# Patient Record
Sex: FEMALE | Race: WHITE | Employment: PART TIME | URBAN - NONMETROPOLITAN AREA
[De-identification: names, ages, dates, MRNs, and addresses within clinical notes are randomized per-mention and may not be internally consistent; named-entity substitution may affect disease eponyms.]

---

## 2017-04-21 LAB
CHOLESTEROL, TOTAL: 193 MG/DL
CHOLESTEROL/HDL RATIO: NORMAL
HDLC SERPL-MCNC: 46 MG/DL (ref 35–70)
LDL CHOLESTEROL CALCULATED: 121 MG/DL (ref 0–160)
TRIGL SERPL-MCNC: 129 MG/DL
VLDLC SERPL CALC-MCNC: NORMAL MG/DL

## 2017-04-25 ENCOUNTER — TELEPHONE (OUTPATIENT)
Dept: FAMILY MEDICINE CLINIC | Age: 62
End: 2017-04-25

## 2017-04-25 DIAGNOSIS — R73.01 ELEVATED FASTING GLUCOSE: Primary | ICD-10-CM

## 2017-04-25 DIAGNOSIS — Z12.39 SCREENING FOR BREAST CANCER: ICD-10-CM

## 2017-04-25 DIAGNOSIS — R74.01 ELEVATED AST (SGOT): ICD-10-CM

## 2017-05-05 LAB — HBA1C MFR BLD: 5.3 %

## 2017-05-11 ENCOUNTER — OFFICE VISIT (OUTPATIENT)
Dept: FAMILY MEDICINE CLINIC | Age: 62
End: 2017-05-11

## 2017-05-11 VITALS
RESPIRATION RATE: 12 BRPM | HEIGHT: 65 IN | DIASTOLIC BLOOD PRESSURE: 80 MMHG | WEIGHT: 186.8 LBS | SYSTOLIC BLOOD PRESSURE: 124 MMHG | BODY MASS INDEX: 31.12 KG/M2 | HEART RATE: 68 BPM

## 2017-05-11 DIAGNOSIS — Z12.11 SCREENING FOR COLON CANCER: ICD-10-CM

## 2017-05-11 DIAGNOSIS — Z00.00 ANNUAL PHYSICAL EXAM: Primary | ICD-10-CM

## 2017-05-11 PROCEDURE — 99396 PREV VISIT EST AGE 40-64: CPT | Performed by: FAMILY MEDICINE

## 2017-05-11 ASSESSMENT — ENCOUNTER SYMPTOMS
SHORTNESS OF BREATH: 0
DIARRHEA: 0
NAUSEA: 0
VOMITING: 0
ABDOMINAL PAIN: 0
BLOOD IN STOOL: 0
EYES NEGATIVE: 1

## 2017-06-13 ENCOUNTER — TELEPHONE (OUTPATIENT)
Dept: FAMILY MEDICINE CLINIC | Age: 62
End: 2017-06-13

## 2017-06-13 ENCOUNTER — NURSE ONLY (OUTPATIENT)
Dept: FAMILY MEDICINE CLINIC | Age: 62
End: 2017-06-13

## 2017-06-13 DIAGNOSIS — Z12.11 SCREEN FOR COLON CANCER: Primary | ICD-10-CM

## 2017-06-13 DIAGNOSIS — K92.1 BLOOD IN STOOL: Primary | ICD-10-CM

## 2017-06-13 LAB
CONTROL: POSITIVE
HEMOCCULT STL QL: POSITIVE

## 2017-06-13 PROCEDURE — 36415 COLL VENOUS BLD VENIPUNCTURE: CPT | Performed by: FAMILY MEDICINE

## 2017-06-13 PROCEDURE — 82274 ASSAY TEST FOR BLOOD FECAL: CPT | Performed by: FAMILY MEDICINE

## 2017-08-28 DIAGNOSIS — F51.01 PRIMARY INSOMNIA: ICD-10-CM

## 2017-08-28 DIAGNOSIS — I10 ESSENTIAL HYPERTENSION: Chronic | ICD-10-CM

## 2017-08-28 RX ORDER — LISINOPRIL 20 MG/1
20 TABLET ORAL DAILY
Qty: 90 TABLET | Refills: 3 | Status: SHIPPED | OUTPATIENT
Start: 2017-08-28 | End: 2018-09-05 | Stop reason: SDUPTHER

## 2017-08-28 RX ORDER — PRAVASTATIN SODIUM 40 MG
40 TABLET ORAL DAILY
Qty: 90 TABLET | Refills: 3 | Status: SHIPPED | OUTPATIENT
Start: 2017-08-28 | End: 2018-09-05 | Stop reason: SDUPTHER

## 2017-08-28 RX ORDER — ZOLPIDEM TARTRATE 5 MG/1
5 TABLET ORAL NIGHTLY PRN
Qty: 30 TABLET | Refills: 5 | Status: SHIPPED | OUTPATIENT
Start: 2017-08-28 | End: 2018-09-05 | Stop reason: SDUPTHER

## 2017-10-19 ENCOUNTER — OFFICE VISIT (OUTPATIENT)
Dept: FAMILY MEDICINE CLINIC | Age: 62
End: 2017-10-19
Payer: COMMERCIAL

## 2017-10-19 VITALS
HEIGHT: 65 IN | TEMPERATURE: 97.8 F | DIASTOLIC BLOOD PRESSURE: 84 MMHG | HEART RATE: 64 BPM | SYSTOLIC BLOOD PRESSURE: 108 MMHG | RESPIRATION RATE: 12 BRPM | BODY MASS INDEX: 31.12 KG/M2 | WEIGHT: 186.8 LBS

## 2017-10-19 DIAGNOSIS — R05.9 COUGH: Primary | ICD-10-CM

## 2017-10-19 DIAGNOSIS — R09.82 POSTNASAL DRIP: ICD-10-CM

## 2017-10-19 PROCEDURE — 99213 OFFICE O/P EST LOW 20 MIN: CPT | Performed by: FAMILY MEDICINE

## 2017-10-19 RX ORDER — PROMETHAZINE HYDROCHLORIDE AND CODEINE PHOSPHATE 6.25; 1 MG/5ML; MG/5ML
SYRUP ORAL
COMMUNITY
Start: 2017-09-11 | End: 2017-10-19 | Stop reason: ALTCHOICE

## 2017-10-19 RX ORDER — DOXYCYCLINE HYCLATE 100 MG
100 TABLET ORAL 2 TIMES DAILY
Qty: 20 TABLET | Refills: 0 | Status: SHIPPED | OUTPATIENT
Start: 2017-10-19 | End: 2017-10-29

## 2017-10-19 RX ORDER — AZITHROMYCIN 250 MG/1
TABLET, FILM COATED ORAL
COMMUNITY
Start: 2017-09-11 | End: 2017-10-19 | Stop reason: ALTCHOICE

## 2017-10-19 ASSESSMENT — ENCOUNTER SYMPTOMS
SHORTNESS OF BREATH: 0
SORE THROAT: 0
GASTROINTESTINAL NEGATIVE: 1
WHEEZING: 0
COUGH: 1

## 2017-10-19 ASSESSMENT — PATIENT HEALTH QUESTIONNAIRE - PHQ9
2. FEELING DOWN, DEPRESSED OR HOPELESS: 0
SUM OF ALL RESPONSES TO PHQ9 QUESTIONS 1 & 2: 0
1. LITTLE INTEREST OR PLEASURE IN DOING THINGS: 0
SUM OF ALL RESPONSES TO PHQ QUESTIONS 1-9: 0

## 2017-10-19 NOTE — PROGRESS NOTES
Chief Complaint   Patient presents with    Cough     seen at [facility] 9/11/2017, still having cough         SUBJECTIVE     Zoe Tran is a 58 y. o.female      Pt complains of ongoing cough over the last 5-6 weeks. Seen at an urgent care in [facility] on 9/11/17. Did CXR which she reports was normal.  Put on Zpak and cough med with codeine. Her cough has persisted. Nonproductive. No fever. No head congestion. Has some postnasal drip. Cough worse with lying flat. Nonsmoker. Review of Systems   Constitutional: Negative for fever. HENT: Positive for postnasal drip. Negative for congestion and sore throat. Respiratory: Positive for cough. Negative for shortness of breath and wheezing. Cardiovascular: Negative. Gastrointestinal: Negative. Neurological: Negative for dizziness and headaches. All other systems reviewed and are negative. OBJECTIVE     /84 (Site: Right Arm, Position: Sitting, Cuff Size: Large Adult)   Pulse 64   Temp 97.8 °F (36.6 °C) (Oral)   Resp 12   Ht 5' 4.5\" (1.638 m)   Wt 186 lb 12.8 oz (84.7 kg)   BMI 31.57 kg/m²     Physical Exam   Constitutional: She appears well-developed and well-nourished. HENT:   Right Ear: Tympanic membrane normal.   Left Ear: Tympanic membrane normal.   Mouth/Throat: Oropharynx is clear and moist.       Cardiovascular: Normal rate and regular rhythm. No murmur heard. Pulmonary/Chest: Breath sounds normal. She has no wheezes. Musculoskeletal: She exhibits no edema. Lymphadenopathy:     She has no cervical adenopathy. ASSESSMENT      1. Cough    2.  Postnasal drip        PLAN     Requested Prescriptions     Signed Prescriptions Disp Refills    doxycycline hyclate (VIBRA-TABS) 100 MG tablet 20 tablet 0     Sig: Take 1 tablet by mouth 2 times daily for 10 days     OTC Flonase 2 sprays per nostril daily      Follow up if not better            Electronically signed by Destiny Nick MD on 10/19/2017 at 2:06

## 2018-04-09 ENCOUNTER — OFFICE VISIT (OUTPATIENT)
Dept: FAMILY MEDICINE CLINIC | Age: 63
End: 2018-04-09
Payer: COMMERCIAL

## 2018-04-09 VITALS
RESPIRATION RATE: 12 BRPM | SYSTOLIC BLOOD PRESSURE: 118 MMHG | WEIGHT: 193.4 LBS | DIASTOLIC BLOOD PRESSURE: 70 MMHG | HEART RATE: 64 BPM | BODY MASS INDEX: 32.68 KG/M2

## 2018-04-09 DIAGNOSIS — G89.29 CHRONIC RIGHT-SIDED LOW BACK PAIN WITH RIGHT-SIDED SCIATICA: Primary | ICD-10-CM

## 2018-04-09 DIAGNOSIS — M54.41 CHRONIC RIGHT-SIDED LOW BACK PAIN WITH RIGHT-SIDED SCIATICA: Primary | ICD-10-CM

## 2018-04-09 DIAGNOSIS — M53.3 PAIN OF RIGHT SACROILIAC JOINT: ICD-10-CM

## 2018-04-09 PROCEDURE — 99213 OFFICE O/P EST LOW 20 MIN: CPT | Performed by: FAMILY MEDICINE

## 2018-04-09 ASSESSMENT — ENCOUNTER SYMPTOMS
BACK PAIN: 1
RESPIRATORY NEGATIVE: 1
ABDOMINAL PAIN: 0

## 2018-04-11 ENCOUNTER — TELEPHONE (OUTPATIENT)
Dept: FAMILY MEDICINE CLINIC | Age: 63
End: 2018-04-11

## 2018-04-11 DIAGNOSIS — M54.41 CHRONIC RIGHT-SIDED LOW BACK PAIN WITH RIGHT-SIDED SCIATICA: Primary | ICD-10-CM

## 2018-04-11 DIAGNOSIS — G89.29 CHRONIC RIGHT-SIDED LOW BACK PAIN WITH RIGHT-SIDED SCIATICA: Primary | ICD-10-CM

## 2018-04-25 ENCOUNTER — TELEPHONE (OUTPATIENT)
Dept: FAMILY MEDICINE CLINIC | Age: 63
End: 2018-04-25

## 2018-05-10 ENCOUNTER — OFFICE VISIT (OUTPATIENT)
Dept: FAMILY MEDICINE CLINIC | Age: 63
End: 2018-05-10
Payer: COMMERCIAL

## 2018-05-10 VITALS
HEIGHT: 65 IN | SYSTOLIC BLOOD PRESSURE: 108 MMHG | WEIGHT: 198.2 LBS | RESPIRATION RATE: 14 BRPM | BODY MASS INDEX: 33.02 KG/M2 | DIASTOLIC BLOOD PRESSURE: 78 MMHG | HEART RATE: 64 BPM | OXYGEN SATURATION: 93 %

## 2018-05-10 DIAGNOSIS — H61.23 BILATERAL IMPACTED CERUMEN: ICD-10-CM

## 2018-05-10 DIAGNOSIS — G89.29 CHRONIC LOW BACK PAIN, UNSPECIFIED BACK PAIN LATERALITY, WITH SCIATICA PRESENCE UNSPECIFIED: ICD-10-CM

## 2018-05-10 DIAGNOSIS — M54.5 CHRONIC LOW BACK PAIN, UNSPECIFIED BACK PAIN LATERALITY, WITH SCIATICA PRESENCE UNSPECIFIED: ICD-10-CM

## 2018-05-10 DIAGNOSIS — Z00.00 ANNUAL PHYSICAL EXAM: Primary | ICD-10-CM

## 2018-05-10 DIAGNOSIS — Z12.31 ENCOUNTER FOR SCREENING MAMMOGRAM FOR MALIGNANT NEOPLASM OF BREAST: ICD-10-CM

## 2018-05-10 PROCEDURE — 99396 PREV VISIT EST AGE 40-64: CPT | Performed by: FAMILY MEDICINE

## 2018-05-10 ASSESSMENT — PATIENT HEALTH QUESTIONNAIRE - PHQ9
1. LITTLE INTEREST OR PLEASURE IN DOING THINGS: 0
SUM OF ALL RESPONSES TO PHQ9 QUESTIONS 1 & 2: 0
2. FEELING DOWN, DEPRESSED OR HOPELESS: 0
SUM OF ALL RESPONSES TO PHQ QUESTIONS 1-9: 0

## 2018-05-10 ASSESSMENT — ENCOUNTER SYMPTOMS
EYES NEGATIVE: 1
NAUSEA: 0
SHORTNESS OF BREATH: 0
BLOOD IN STOOL: 0
DIARRHEA: 0
VOMITING: 0
ABDOMINAL PAIN: 0
BACK PAIN: 1

## 2018-07-21 LAB
AVERAGE GLUCOSE: NORMAL
CHOLESTEROL, TOTAL: 197 MG/DL
CHOLESTEROL/HDL RATIO: NORMAL
HBA1C MFR BLD: 5.7 %
HDLC SERPL-MCNC: 41 MG/DL (ref 35–70)
LDL CHOLESTEROL CALCULATED: 117 MG/DL (ref 0–160)
TRIGL SERPL-MCNC: 193 MG/DL
VLDLC SERPL CALC-MCNC: NORMAL MG/DL

## 2018-07-25 ENCOUNTER — TELEPHONE (OUTPATIENT)
Dept: FAMILY MEDICINE CLINIC | Age: 63
End: 2018-07-25

## 2018-07-25 DIAGNOSIS — R79.89 ABNORMAL LFTS: Primary | ICD-10-CM

## 2018-07-27 ENCOUNTER — TELEPHONE (OUTPATIENT)
Dept: FAMILY MEDICINE CLINIC | Age: 63
End: 2018-07-27

## 2018-07-27 DIAGNOSIS — M25.551 RIGHT HIP PAIN: ICD-10-CM

## 2018-07-27 DIAGNOSIS — G89.29 CHRONIC LOW BACK PAIN, UNSPECIFIED BACK PAIN LATERALITY, WITH SCIATICA PRESENCE UNSPECIFIED: Primary | ICD-10-CM

## 2018-07-27 DIAGNOSIS — M54.5 CHRONIC LOW BACK PAIN, UNSPECIFIED BACK PAIN LATERALITY, WITH SCIATICA PRESENCE UNSPECIFIED: Primary | ICD-10-CM

## 2018-07-27 NOTE — TELEPHONE ENCOUNTER
Referral to Dr Micki Qiu made for 8/21/2018 @ 1020. Arrive 15 mins early. Take insurance card, id and list of meds. [facility] needs to take disc to appt. Left message for pt regarding this information.

## 2018-08-01 ENCOUNTER — HOSPITAL ENCOUNTER (OUTPATIENT)
Dept: MAMMOGRAPHY | Age: 63
Discharge: HOME | End: 2018-08-01
Payer: COMMERCIAL

## 2018-08-01 DIAGNOSIS — Z12.31 ENCOUNTER FOR SCREENING MAMMOGRAM FOR MALIGNANT NEOPLASM OF BREAST: ICD-10-CM

## 2018-08-01 PROCEDURE — 77067 SCR MAMMO BI INCL CAD: CPT

## 2018-08-07 ENCOUNTER — TELEPHONE (OUTPATIENT)
Dept: FAMILY MEDICINE CLINIC | Age: 63
End: 2018-08-07

## 2018-08-07 DIAGNOSIS — R79.89 ELEVATED LFTS: Primary | ICD-10-CM

## 2018-09-05 DIAGNOSIS — F51.01 PRIMARY INSOMNIA: ICD-10-CM

## 2018-09-05 DIAGNOSIS — I10 ESSENTIAL HYPERTENSION: Chronic | ICD-10-CM

## 2018-09-05 RX ORDER — PRAVASTATIN SODIUM 40 MG
40 TABLET ORAL DAILY
Qty: 90 TABLET | Refills: 3 | Status: SHIPPED | OUTPATIENT
Start: 2018-09-05 | End: 2019-05-16 | Stop reason: SDUPTHER

## 2018-09-05 RX ORDER — ZOLPIDEM TARTRATE 5 MG/1
5 TABLET ORAL NIGHTLY PRN
Qty: 30 TABLET | Refills: 1 | Status: SHIPPED | OUTPATIENT
Start: 2018-09-05 | End: 2019-03-05

## 2018-09-05 RX ORDER — LISINOPRIL 20 MG/1
20 TABLET ORAL DAILY
Qty: 90 TABLET | Refills: 3 | Status: SHIPPED | OUTPATIENT
Start: 2018-09-05 | End: 2019-05-16 | Stop reason: SDUPTHER

## 2018-10-23 ENCOUNTER — TELEPHONE (OUTPATIENT)
Dept: FAMILY MEDICINE CLINIC | Age: 63
End: 2018-10-23

## 2018-10-30 ENCOUNTER — OFFICE VISIT (OUTPATIENT)
Dept: FAMILY MEDICINE CLINIC | Age: 63
End: 2018-10-30
Payer: COMMERCIAL

## 2018-10-30 ENCOUNTER — TELEPHONE (OUTPATIENT)
Dept: FAMILY MEDICINE CLINIC | Age: 63
End: 2018-10-30

## 2018-10-30 VITALS
RESPIRATION RATE: 16 BRPM | SYSTOLIC BLOOD PRESSURE: 112 MMHG | WEIGHT: 189 LBS | BODY MASS INDEX: 31.45 KG/M2 | DIASTOLIC BLOOD PRESSURE: 80 MMHG | HEART RATE: 64 BPM | TEMPERATURE: 97.4 F

## 2018-10-30 DIAGNOSIS — L30.4 INTERTRIGO: Primary | ICD-10-CM

## 2018-10-30 PROCEDURE — 99213 OFFICE O/P EST LOW 20 MIN: CPT | Performed by: FAMILY MEDICINE

## 2018-10-30 RX ORDER — FLUCONAZOLE 100 MG/1
100 TABLET ORAL DAILY
Qty: 7 TABLET | Refills: 0 | Status: SHIPPED | OUTPATIENT
Start: 2018-10-30 | End: 2018-11-06 | Stop reason: SDUPTHER

## 2018-10-30 ASSESSMENT — ENCOUNTER SYMPTOMS
GASTROINTESTINAL NEGATIVE: 1
RESPIRATORY NEGATIVE: 1

## 2018-10-30 NOTE — TELEPHONE ENCOUNTER
[facility].  MALLY

## 2018-11-06 ENCOUNTER — TELEPHONE (OUTPATIENT)
Dept: FAMILY MEDICINE CLINIC | Age: 63
End: 2018-11-06

## 2018-11-06 DIAGNOSIS — L30.4 INTERTRIGO: ICD-10-CM

## 2018-11-06 RX ORDER — FLUCONAZOLE 100 MG/1
100 TABLET ORAL DAILY
Qty: 7 TABLET | Refills: 0 | Status: SHIPPED | OUTPATIENT
Start: 2018-11-06 | End: 2018-11-13

## 2018-11-06 NOTE — TELEPHONE ENCOUNTER
Patient states that her rash is clearing but it is not gone and she took her last pill today. Does she need to give it more time or do you want to give her something else. She uses [facility] in [facility].   Please call her back at [phone]

## 2019-05-16 ENCOUNTER — OFFICE VISIT (OUTPATIENT)
Dept: FAMILY MEDICINE CLINIC | Age: 64
End: 2019-05-16
Payer: COMMERCIAL

## 2019-05-16 VITALS
DIASTOLIC BLOOD PRESSURE: 82 MMHG | SYSTOLIC BLOOD PRESSURE: 120 MMHG | HEIGHT: 65 IN | BODY MASS INDEX: 30.82 KG/M2 | HEART RATE: 68 BPM | TEMPERATURE: 97.6 F | RESPIRATION RATE: 12 BRPM | WEIGHT: 185 LBS

## 2019-05-16 DIAGNOSIS — I10 ESSENTIAL HYPERTENSION: Chronic | ICD-10-CM

## 2019-05-16 DIAGNOSIS — Z12.31 ENCOUNTER FOR SCREENING MAMMOGRAM FOR MALIGNANT NEOPLASM OF BREAST: ICD-10-CM

## 2019-05-16 DIAGNOSIS — Z00.00 ANNUAL PHYSICAL EXAM: Primary | ICD-10-CM

## 2019-05-16 DIAGNOSIS — F51.01 PRIMARY INSOMNIA: ICD-10-CM

## 2019-05-16 DIAGNOSIS — E78.5 HYPERLIPIDEMIA, UNSPECIFIED HYPERLIPIDEMIA TYPE: ICD-10-CM

## 2019-05-16 PROCEDURE — 99396 PREV VISIT EST AGE 40-64: CPT | Performed by: FAMILY MEDICINE

## 2019-05-16 RX ORDER — ZOLPIDEM TARTRATE 5 MG/1
5 TABLET ORAL NIGHTLY PRN
Qty: 30 TABLET | Refills: 1 | Status: SHIPPED | OUTPATIENT
Start: 2019-05-16 | End: 2019-08-16

## 2019-05-16 RX ORDER — PRAVASTATIN SODIUM 40 MG
40 TABLET ORAL DAILY
Qty: 90 TABLET | Refills: 3 | Status: SHIPPED | OUTPATIENT
Start: 2019-05-16 | End: 2020-05-04 | Stop reason: SDUPTHER

## 2019-05-16 RX ORDER — ZOLPIDEM TARTRATE 5 MG/1
5 TABLET ORAL NIGHTLY PRN
COMMUNITY
End: 2019-05-16 | Stop reason: SDUPTHER

## 2019-05-16 RX ORDER — LISINOPRIL 20 MG/1
20 TABLET ORAL DAILY
Qty: 90 TABLET | Refills: 3 | Status: SHIPPED | OUTPATIENT
Start: 2019-05-16 | End: 2020-05-04 | Stop reason: SDUPTHER

## 2019-05-16 ASSESSMENT — VISUAL ACUITY
OS_CC: 20/20
OD_CC: 20/20

## 2019-05-16 ASSESSMENT — ENCOUNTER SYMPTOMS
BLOOD IN STOOL: 0
ABDOMINAL PAIN: 0
SHORTNESS OF BREATH: 0
DIARRHEA: 0
NAUSEA: 0
EYES NEGATIVE: 1
VOMITING: 0

## 2019-05-16 ASSESSMENT — PATIENT HEALTH QUESTIONNAIRE - PHQ9
SUM OF ALL RESPONSES TO PHQ QUESTIONS 1-9: 0
SUM OF ALL RESPONSES TO PHQ QUESTIONS 1-9: 0
2. FEELING DOWN, DEPRESSED OR HOPELESS: 0
1. LITTLE INTEREST OR PLEASURE IN DOING THINGS: 0
SUM OF ALL RESPONSES TO PHQ9 QUESTIONS 1 & 2: 0

## 2019-05-16 NOTE — PROGRESS NOTES
Chief Complaint   Patient presents with    Annual Exam     Annual Work Physical for        JESS Nichols is a 61 y. o.female    Pt presents for annual wellness physical exam.        Pt stable since last visit- no newproblems for diagnoses listed below:  Patient Active Problem List   Diagnosis    Hyperlipidemia    Insomnia    Essential hypertension     Doing well. Denies complaint today. Needs clearance for bus driving. She denies problems with vision or hearing. No uncontrolled blood pressure. No h/o seizure disorder. No color blindness. Her chronic conditions are stable. She uses 2.5mg of ambien once weekly, usually on Sundays. She remains active. Colon cancer screening UTD. Due for mammogram soon. Will be due for labs in July. Takes all meds as directed and denies side effects. No recent illnesses or hospitalizations. Nonsmoker. Body mass index is 30.79 kg/m². Review of Systems   Constitutional: Negative for chills, fatigue and fever. HENT: Negative. Eyes: Negative. Respiratory: Negative for shortness of breath. Cardiovascular: Negative for chest pain, palpitations and leg swelling. Gastrointestinal: Negative for abdominal pain, blood in stool, diarrhea, nausea and vomiting. Genitourinary: Negative for dysuria. Musculoskeletal: Negative for arthralgias and myalgias. Skin: Negative for rash. Neurological: Negative for dizziness and headaches. Hematological: Negative for adenopathy. Psychiatric/Behavioral: Negative. All other systems reviewed and are negative.           OBJECTIVE     /82 (Site: Left Upper Arm, Position: Sitting, Cuff Size: Medium Adult)   Pulse 68   Temp 97.6 °F (36.4 °C) (Oral)   Resp 12   Ht 5' 5\" (1.651 m)   Wt 185 lb (83.9 kg)   BMI 30.79 kg/m²     Wt Readings from Last 3 Encounters:   05/16/19 185 lb (83.9 kg)   10/30/18 189 lb (85.7 kg)   05/10/18 198 lb 3.2 oz (89.9 kg)       Physical Exam Constitutional: She is oriented to person, place, and time. She appears well-developed and well-nourished. HENT:   Head: Normocephalic and atraumatic. Mouth/Throat: Oropharynx is clear and moist.   TM's normal.   Eyes: Conjunctivae are normal.   Neck: Neck supple. Carotid bruit is not present. No thyromegaly present. Cardiovascular: Normal rate, regular rhythm and normal heart sounds. No murmur heard. Pulmonary/Chest: Effort normal and breath sounds normal. She has no wheezes. Abdominal: Soft. Bowel sounds are normal. There is no tenderness. There is no rebound and no guarding. Musculoskeletal: She exhibits no edema. Lymphadenopathy:     She has no cervical adenopathy. Neurological: She is alert and oriented to person, place, and time. Skin: Skin is warm and dry. No rash noted. Psychiatric: She has a normal mood and affect. Her behavior is normal.   Vitals reviewed. Immunization History   Administered Date(s) Administered    PPD Test 08/13/2012    Tdap (Boostrix, Adacel) 08/13/2012         Health Maintenance   Topic Date Due    Hepatitis C screen  1955    HIV screen  09/17/1970    Shingles Vaccine (1 of 2) 09/17/2005    Potassium monitoring  03/23/2016    Creatinine monitoring  03/23/2016    Cervical cancer screen  05/12/2018    Flu vaccine (Season Ended) 10/30/2019 (Originally 9/1/2019)    A1C test (Diabetic or Prediabetic)  07/21/2019    Breast cancer screen  08/01/2019    Colon cancer screen colonoscopy  08/02/2022    DTaP/Tdap/Td vaccine (2 - Td) 08/13/2022    Lipid screen  07/21/2023    Pneumococcal 0-64 years Vaccine  Aged Out         ASSESSMENT      1. Annual physical exam    2. Essential hypertension    3. Primary insomnia    4. Hyperlipidemia, unspecified hyperlipidemia type    5.  Encounter for screening mammogram for malignant neoplasm of breast          PLAN        Orders Placed This Encounter   Procedures    SOPHIE DIGITAL SCREEN W CAD BILATERAL Standing Status:   Future     Standing Expiration Date:   7/16/2020     Order Specific Question:   Reason for exam:     Answer:   screening    Comprehensive Metabolic Panel     Standing Status:   Future     Standing Expiration Date:   5/15/2020    Hemoglobin A1C     Standing Status:   Future     Standing Expiration Date:   5/15/2020    Lipid Panel     Standing Status:   Future     Standing Expiration Date:   5/15/2020     Order Specific Question:   Is Patient Fasting?/# of Hours     Answer:   12     Mammogram in August    Labs as above in July    Continue current meds    Controlled Substances Monitoring:     RX Monitoring 5/16/2019   Attestation The Prescription Monitoring Report for this patient was reviewed today. 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 Maulik Dominic received counseling on the following healthy behaviors: medication adherence    Patient given educational materials on wellness for age. I have instructed Maulik Dominic to complete a self tracking handout on Blood Pressures  and instructed them to bring it with them to her next appointment. Discussed use, benefit, and side effects of prescribed medications. Barriers to medication compliance addressed. All patient questions answered. Pt voiced understanding.          Electronically signed by Brandy Casey MD on 5/16/2019 at 12:55 PM

## 2019-05-16 NOTE — PATIENT INSTRUCTIONS
early.  · Cholesterol. Your doctor will tell you how often to have this done based on your age, family history, or other things that can increase your risk for heart attack and stroke. · Blood pressure. Have your blood pressure checked during a routine doctor visit. Your doctor will tell you how often to check your blood pressure based on your age, your blood pressure results, and other factors. · Mammogram. Ask your doctor how often you should have a mammogram, which is an X-ray of your breasts. A mammogram can spot breast cancer before it can be felt and when it is easiest to treat. · Pap test and pelvic exam. Ask your doctor how often you should have a Pap test. You may not need to have a Pap test as often as you used to. · Vision. Have your eyes checked every year or two or as often as your doctor suggests. Some experts recommend that you have yearly exams for glaucoma and other age-related eye problems starting at age 48. · Hearing. Tell your doctor if you notice any change in your hearing. You can have tests to find out how well you hear. · Diabetes. Ask your doctor whether you should have tests for diabetes. · Colorectal cancer. Your risk for colorectal cancer gets higher as you get older. Some experts say that adults should start regular screening at age 48 and stop at age 76. Others say to start before age 48 or continue after age 76. Talk with your doctor about your risk and when to start and stop screening. · Thyroid disease. Talk to your doctor about whether to have your thyroid checked as part of a regular physical exam. Women have an increased chance of a thyroid problem. · Osteoporosis. You should begin tests for bone density at age 72. If you are younger than 72, ask your doctor whether you have factors that may increase your risk for this disease. You may want to have this test before age 72. · Heart attack and stroke risk.  At least every 4 to 6 years, you should have your risk for heart attack and stroke assessed. Your doctor uses factors such as your age, blood pressure, cholesterol, and whether you smoke or have diabetes to show what your risk for a heart attack or stroke is over the next 10 years. When should you call for help? Watch closely for changes in your health, and be sure to contact your doctor if you have any problems or symptoms that concern you. Where can you learn more? Go to https://Epic SciencespediaDexuseweb.Snoball. org and sign in to your [employer]Helper account. Enter L225 in the [facility] box to learn more about \"Well Visit, Women 50 to 72: Care Instructions. \"     If you do not have an account, please click on the \"Sign Up Now\" link. Current as of: December 13, 2018  Content Version: 12.0  © 7691-0868 Healthwise, Incorporated. Care instructions adapted under license by [facility] ([facility]). If you have questions about a medical condition or this instruction, always ask your healthcare professional. [place] [address] any warranty or liability for your use of this information.

## 2019-07-30 LAB
AVERAGE GLUCOSE: NORMAL
CHOLESTEROL, TOTAL: 188 MG/DL
CHOLESTEROL/HDL RATIO: NORMAL
HBA1C MFR BLD: 5.8 %
HDLC SERPL-MCNC: 42 MG/DL (ref 35–70)
LDL CHOLESTEROL CALCULATED: 109 MG/DL (ref 0–160)
TRIGL SERPL-MCNC: 186 MG/DL
VLDLC SERPL CALC-MCNC: NORMAL MG/DL

## 2019-08-01 ENCOUNTER — TELEPHONE (OUTPATIENT)
Dept: FAMILY MEDICINE CLINIC | Age: 64
End: 2019-08-01

## 2019-08-01 NOTE — TELEPHONE ENCOUNTER
----- Message from Selena Mcdaniel MD sent at 7/31/2019  9:47 PM EDT -----  Notify her that her labs look ok. LFTs improved from previous. Continue current meds.  CG

## 2019-08-02 ENCOUNTER — HOSPITAL ENCOUNTER (OUTPATIENT)
Dept: MAMMOGRAPHY | Age: 64
Discharge: HOME | End: 2019-08-02
Payer: COMMERCIAL

## 2019-08-02 DIAGNOSIS — Z12.31 ENCOUNTER FOR SCREENING MAMMOGRAM FOR MALIGNANT NEOPLASM OF BREAST: ICD-10-CM

## 2019-08-02 PROCEDURE — 77067 SCR MAMMO BI INCL CAD: CPT

## 2019-10-28 ENCOUNTER — TELEPHONE (OUTPATIENT)
Dept: FAMILY MEDICINE CLINIC | Age: 64
End: 2019-10-28

## 2020-05-04 ENCOUNTER — OFFICE VISIT (OUTPATIENT)
Dept: PRIMARY CARE CLINIC | Age: 65
End: 2020-05-04
Payer: COMMERCIAL

## 2020-05-04 VITALS
WEIGHT: 192 LBS | DIASTOLIC BLOOD PRESSURE: 76 MMHG | SYSTOLIC BLOOD PRESSURE: 120 MMHG | OXYGEN SATURATION: 96 % | HEIGHT: 64 IN | RESPIRATION RATE: 16 BRPM | BODY MASS INDEX: 32.78 KG/M2 | TEMPERATURE: 97.6 F | HEART RATE: 68 BPM

## 2020-05-04 PROCEDURE — 99396 PREV VISIT EST AGE 40-64: CPT | Performed by: FAMILY MEDICINE

## 2020-05-04 RX ORDER — ZOLPIDEM TARTRATE 5 MG/1
5 TABLET ORAL NIGHTLY PRN
Qty: 30 TABLET | Refills: 0 | Status: SHIPPED | OUTPATIENT
Start: 2020-05-04 | End: 2021-01-04 | Stop reason: SDUPTHER

## 2020-05-04 RX ORDER — PRAVASTATIN SODIUM 40 MG
40 TABLET ORAL DAILY
Qty: 90 TABLET | Refills: 3 | Status: SHIPPED | OUTPATIENT
Start: 2020-05-04 | End: 2021-05-18 | Stop reason: SDUPTHER

## 2020-05-04 RX ORDER — ZOLPIDEM TARTRATE 5 MG/1
5 TABLET ORAL NIGHTLY PRN
COMMUNITY
End: 2020-05-04 | Stop reason: SDUPTHER

## 2020-05-04 RX ORDER — LISINOPRIL 20 MG/1
20 TABLET ORAL DAILY
Qty: 90 TABLET | Refills: 3 | Status: SHIPPED | OUTPATIENT
Start: 2020-05-04 | End: 2021-05-18 | Stop reason: SDUPTHER

## 2020-05-04 ASSESSMENT — ENCOUNTER SYMPTOMS
SHORTNESS OF BREATH: 0
VOMITING: 0
BLOOD IN STOOL: 0
NAUSEA: 0
ABDOMINAL PAIN: 0
DIARRHEA: 0
EYES NEGATIVE: 1

## 2020-05-04 ASSESSMENT — PATIENT HEALTH QUESTIONNAIRE - PHQ9
SUM OF ALL RESPONSES TO PHQ QUESTIONS 1-9: 0
SUM OF ALL RESPONSES TO PHQ QUESTIONS 1-9: 0
1. LITTLE INTEREST OR PLEASURE IN DOING THINGS: 0
SUM OF ALL RESPONSES TO PHQ9 QUESTIONS 1 & 2: 0
2. FEELING DOWN, DEPRESSED OR HOPELESS: 0

## 2020-05-04 ASSESSMENT — VISUAL ACUITY
OS_CC: 20/20
OD_CC: 20/20

## 2020-05-04 NOTE — PROGRESS NOTES
1. Annual physical exam    2. Essential hypertension    3. Hyperlipidemia, unspecified hyperlipidemia type    4. Primary insomnia    5. Encounter for screening mammogram for malignant neoplasm of breast        PLAN        Orders Placed This Encounter   Procedures    SOPHIE LELAND DIGITAL SCREEN BILATERAL     Standing Status:   Future     Standing Expiration Date:   7/4/2021     Order Specific Question:   Reason for exam:     Answer:   screening    Lipid Panel     Standing Status:   Future     Standing Expiration Date:   5/4/2021     Order Specific Question:   Is Patient Fasting?/# of Hours     Answer:   12    Comprehensive Metabolic Panel     Standing Status:   Future     Standing Expiration Date:   5/4/2021     Labs and mammogram as above this summer    Med refills as below    OARRS report reviewed and no contraindications 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 Requested Prescriptions     Signed Prescriptions Disp Refills    lisinopril (PRINIVIL;ZESTRIL) 20 MG tablet 90 tablet 3     Sig: Take 1 tablet by mouth daily    pravastatin (PRAVACHOL) 40 MG tablet 90 tablet 3     Sig: Take 1 tablet by mouth daily    zolpidem (AMBIEN) 5 MG tablet 30 tablet 0     Sig: Take 1 tablet by mouth nightly as needed for Sleep for up to 30 doses. Jerome Brothers received counseling on the following healthy behaviors: nutrition, exercise and medication adherence    Patient given educational materials on wellness for age. I have instructed Jerome Brothers to complete a self tracking handout on Blood Pressures  and instructed them to bring it with them to her next appointment. Discussed use, benefit, and side effects of prescribed medications. Barriers to medication compliance addressed. All patient questions answered. Pt voiced understanding.      Follow up yearly and prn        Electronically signed by Elena Correa MD on 5/4/2020 at 2:02 PM

## 2020-05-04 NOTE — PATIENT INSTRUCTIONS
Patient Education        Well Visit, Women 48 to 72: Care Instructions  Your Care Instructions    Physical exams can help you stay healthy. Your doctor has checked your overall health and may have suggested ways to take good care of yourself. He or she also may have recommended tests. At home, you can help prevent illness with healthy eating, regular exercise, and other steps. Follow-up care is a key part of your treatment and safety. Be sure to make and go to all appointments, and call your doctor if you are having problems. It's also a good idea to know your test results and keep a list of the medicines you take. How can you care for yourself at home? · Reach and stay at a healthy weight. This will lower your risk for many problems, such as obesity, diabetes, heart disease, and high blood pressure. · Get at least 30 minutes of exercise on most days of the week. Walking is a good choice. You also may want to do other activities, such as running, swimming, cycling, or playing tennis or team sports. · Do not smoke. Smoking can make health problems worse. If you need help quitting, talk to your doctor about stop-smoking programs and medicines. These can increase your chances of quitting for good. · Protect your skin from too much sun. When you're outdoors from 10 a.m. to 4 p.m., stay in the shade or cover up with clothing and a hat with a wide brim. Wear sunglasses that block UV rays. Even when it's cloudy, put broad-spectrum sunscreen (SPF 30 or higher) on any exposed skin. · See a dentist one or two times a year for checkups and to have your teeth cleaned. · Wear a seat belt in the car. Follow your doctor's advice about when to have certain tests. These tests can spot problems early. · Cholesterol. Your doctor will tell you how often to have this done based on your age, family history, or other things that can increase your risk for heart attack and stroke. · Blood pressure.  Have your blood pressure checked during a routine doctor visit. Your doctor will tell you how often to check your blood pressure based on your age, your blood pressure results, and other factors. · Mammogram. Ask your doctor how often you should have a mammogram, which is an X-ray of your breasts. A mammogram can spot breast cancer before it can be felt and when it is easiest to treat. · Pap test and pelvic exam. Ask your doctor how often you should have a Pap test. You may not need to have a Pap test as often as you used to. · Vision. Have your eyes checked every year or two or as often as your doctor suggests. Some experts recommend that you have yearly exams for glaucoma and other age-related eye problems starting at age 48. · Hearing. Tell your doctor if you notice any change in your hearing. You can have tests to find out how well you hear. · Diabetes. Ask your doctor whether you should have tests for diabetes. · Colorectal cancer. Your risk for colorectal cancer gets higher as you get older. Some experts say that adults should start regular screening at age 48 and stop at age 76. Others say to start before age 48 or continue after age 76. Talk with your doctor about your risk and when to start and stop screening. · Thyroid disease. Talk to your doctor about whether to have your thyroid checked as part of a regular physical exam. Women have an increased chance of a thyroid problem. · Osteoporosis. You should begin tests for bone density at age 72. If you are younger than 72, ask your doctor whether you have factors that may increase your risk for this disease. You may want to have this test before age 72. · Heart attack and stroke risk. At least every 4 to 6 years, you should have your risk for heart attack and stroke assessed. Your doctor uses factors such as your age, blood pressure, cholesterol, and whether you smoke or have diabetes to show what your risk for a heart attack or stroke is over the next 10 years.   When should you call for help? Watch closely for changes in your health, and be sure to contact your doctor if you have any problems or symptoms that concern you. Where can you learn more? Go to https://chharsh.healthDreamise. org and sign in to your [facility] account. Enter J829 in the [facility] box to learn more about \"Well Visit, Women 50 to 72: Care Instructions. \"     If you do not have an account, please click on the \"Sign Up Now\" link. Current as of: August 21, 2019Content Version: 12.4  © 9303-8601 Healthwise, Incorporated. Care instructions adapted under license by [address] If you have questions about a medical condition or this instruction, always ask your healthcare professional. [address] any warranty or liability for your use of this information.

## 2020-07-13 ENCOUNTER — OFFICE VISIT (OUTPATIENT)
Dept: FAMILY MEDICINE CLINIC | Age: 65
End: 2020-07-13
Payer: COMMERCIAL

## 2020-07-13 VITALS
BODY MASS INDEX: 32.02 KG/M2 | DIASTOLIC BLOOD PRESSURE: 70 MMHG | RESPIRATION RATE: 12 BRPM | WEIGHT: 192.2 LBS | TEMPERATURE: 98.1 F | HEIGHT: 65 IN | SYSTOLIC BLOOD PRESSURE: 120 MMHG | HEART RATE: 60 BPM

## 2020-07-13 PROCEDURE — 99213 OFFICE O/P EST LOW 20 MIN: CPT | Performed by: FAMILY MEDICINE

## 2020-07-13 SDOH — ECONOMIC STABILITY: FOOD INSECURITY: WITHIN THE PAST 12 MONTHS, THE FOOD YOU BOUGHT JUST DIDN'T LAST AND YOU DIDN'T HAVE MONEY TO GET MORE.: NEVER TRUE

## 2020-07-13 SDOH — ECONOMIC STABILITY: FOOD INSECURITY: WITHIN THE PAST 12 MONTHS, YOU WORRIED THAT YOUR FOOD WOULD RUN OUT BEFORE YOU GOT MONEY TO BUY MORE.: NEVER TRUE

## 2020-07-13 SDOH — ECONOMIC STABILITY: TRANSPORTATION INSECURITY
IN THE PAST 12 MONTHS, HAS LACK OF TRANSPORTATION KEPT YOU FROM MEETINGS, WORK, OR FROM GETTING THINGS NEEDED FOR DAILY LIVING?: NO

## 2020-07-13 SDOH — ECONOMIC STABILITY: TRANSPORTATION INSECURITY
IN THE PAST 12 MONTHS, HAS THE LACK OF TRANSPORTATION KEPT YOU FROM MEDICAL APPOINTMENTS OR FROM GETTING MEDICATIONS?: NO

## 2020-07-13 SDOH — ECONOMIC STABILITY: INCOME INSECURITY: HOW HARD IS IT FOR YOU TO PAY FOR THE VERY BASICS LIKE FOOD, HOUSING, MEDICAL CARE, AND HEATING?: NOT HARD AT ALL

## 2020-07-13 ASSESSMENT — ENCOUNTER SYMPTOMS
RESPIRATORY NEGATIVE: 1
SINUS PAIN: 0
SORE THROAT: 0
SINUS PRESSURE: 0

## 2020-07-13 NOTE — PROGRESS NOTES
Chief Complaint   Patient presents with   Mary Acosta is a 59 y. o.female      Pt complains of a 2-3 day h/o right ear fullness and decreased hearing. Has tried sweet oil in the ear but it's no better. No pain or congestion. No fever. No drainage from the ear. She has a h/o wax buildup in the past.    Review of Systems   Constitutional: Negative for fever. HENT: Positive for hearing loss (right). Negative for congestion, ear discharge, ear pain, sinus pressure, sinus pain and sore throat. Respiratory: Negative. Neurological: Negative. All other systems reviewed and are negative. OBJECTIVE     /70   Pulse 60   Temp 98.1 °F (36.7 °C) (Infrared)   Resp 12   Ht 5' 4.7\" (1.643 m)   Wt 192 lb 3.2 oz (87.2 kg)   BMI 32.28 kg/m²     Physical Exam  Vitals signs reviewed. Constitutional:       General: She is not in acute distress. Appearance: She is well-developed. HENT:      Head: Normocephalic and atraumatic. Right Ear: There is impacted cerumen. Left Ear: Tympanic membrane normal.   Eyes:      Conjunctiva/sclera: Conjunctivae normal.   Cardiovascular:      Rate and Rhythm: Normal rate and regular rhythm. Heart sounds: No murmur. Pulmonary:      Breath sounds: Normal breath sounds. No wheezing. Musculoskeletal:      Right lower leg: No edema. Left lower leg: No edema. Lymphadenopathy:      Cervical: No cervical adenopathy. Neurological:      Mental Status: She is alert. ASSESSMENT      1.  Excessive cerumen in right ear canal        PLAN     Warm water irrigation of the right ear performed      Follow up if not better            Electronically signed by Preston Stone MD on 7/13/2020 at 2:00 PM

## 2020-08-06 ENCOUNTER — HOSPITAL ENCOUNTER (OUTPATIENT)
Dept: MAMMOGRAPHY | Age: 65
Discharge: HOME | End: 2020-08-06
Payer: COMMERCIAL

## 2020-09-02 ENCOUNTER — HOSPITAL ENCOUNTER (OUTPATIENT)
Dept: MAMMOGRAPHY | Age: 65
Discharge: HOME | End: 2020-09-02
Payer: MEDICARE

## 2020-09-02 PROCEDURE — 77063 BREAST TOMOSYNTHESIS BI: CPT

## 2020-10-19 ENCOUNTER — VIRTUAL VISIT (OUTPATIENT)
Dept: FAMILY MEDICINE CLINIC | Age: 65
End: 2020-10-19
Payer: MEDICARE

## 2020-10-19 PROCEDURE — 99214 OFFICE O/P EST MOD 30 MIN: CPT | Performed by: NURSE PRACTITIONER

## 2020-10-19 ASSESSMENT — ENCOUNTER SYMPTOMS
SINUS PAIN: 0
SHORTNESS OF BREATH: 0
VOMITING: 0
NAUSEA: 0
DIARRHEA: 0
ABDOMINAL PAIN: 0
BACK PAIN: 0
WHEEZING: 0
TROUBLE SWALLOWING: 0
COLOR CHANGE: 0
RHINORRHEA: 0
SINUS PRESSURE: 0
COUGH: 1

## 2020-10-19 NOTE — PROGRESS NOTES
[facility]  [address] [facility] [address]  Dept: [phone]  Dept Fax: [phone]: [phone]      10/19/2020    TELEHEALTH EVALUATION -- Audio/Visual (During QFEOJ-62 public health emergency)    HPI:    Milena Mccartney (:  1955) has requested an audio/video evaluation for the following concern(s):    Pt presents to the office today via video visit. She reports that she Started on 10/15/2020 with body aches and chills. She has also has some sneezing and congestion. Her boyfriend with S/S as well and he is getting tested for COVID today. Today she did have a 101 fever and now she no longer can taste or smell. She denies any SOB or chest pain. Fever    This is a new problem. The current episode started today. The problem has been unchanged. The maximum temperature noted was 101 to 101.9 F. Associated symptoms include congestion, coughing, headaches, muscle aches and sleepiness. Pertinent negatives include no abdominal pain, chest pain, diarrhea, nausea, rash, urinary pain, vomiting or wheezing. She has tried acetaminophen and NSAIDs for the symptoms. The treatment provided mild relief. Risk factors: sick contacts    Risk factors: no contaminated food, no contaminated water, no hx of cancer, no immunosuppression, no occupational exposure, no recent sickness and no recent travel        Review of Systems   Constitutional: Positive for chills, fatigue and fever. Negative for diaphoresis. HENT: Positive for congestion and sneezing. Negative for nosebleeds, postnasal drip, rhinorrhea, sinus pressure, sinus pain and trouble swallowing. Respiratory: Positive for cough. Negative for shortness of breath and wheezing. Cardiovascular: Negative for chest pain and palpitations. Gastrointestinal: Negative for abdominal pain, diarrhea, nausea and vomiting. Genitourinary: Negative for dysuria.    Musculoskeletal: Positive for arthralgias. Negative for back pain, gait problem and myalgias. Skin: Negative for color change and rash. Neurological: Positive for headaches. Negative for dizziness and weakness. Prior to Visit Medications    Medication Sig Taking? Authorizing Provider   lisinopril (PRINIVIL;ZESTRIL) 20 MG tablet Take 1 tablet by mouth daily  Brett Anthony MD   pravastatin (PRAVACHOL) 40 MG tablet Take 1 tablet by mouth daily  Brett Anthony MD   zolpidem (AMBIEN) 5 MG tablet Take 1 tablet by mouth nightly as needed for Sleep for up to 30 doses. Brett Anthony MD   aspirin 81 MG tablet Take 81 mg by mouth daily  Historical Provider, MD       Social History     Tobacco Use    Smoking status: Never Smoker    Smokeless tobacco: Never Used   Substance Use Topics    Alcohol use: Yes     Comment: socially    Drug use: No        Allergies   Allergen Reactions    Pcn [Penicillins] Rash   ,   Past Medical History:   Diagnosis Date    Herpes zoster 11/12    Hyperlipidemia     Hypertension    ,   Past Surgical History:   Procedure Laterality Date    DILATION AND CURETTAGE OF UTERUS         PHYSICAL EXAMINATION:  [ INSTRUCTIONS:  \"[x]\" Indicates a positive item  \"[]\" Indicates a negative item  -- DELETE ALL ITEMS NOT EXAMINED]  Vital Signs: (As obtained by patient/caregiver or practitioner observation)    Blood pressure-  Heart rate-    Respiratory rate- 16 observed   Temperature-  Pulse oximetry-     Constitutional: [x] Appears well-developed and well-nourished [x] No apparent distress      [] Abnormal-   Mental status  [x] Alert and awake  [x] Oriented to person/place/time [x]Able to follow commands      Eyes:  EOM    [x]  Normal  [] Abnormal-  Sclera  [x]  Normal  [] Abnormal -         Discharge [x]  None visible  [] Abnormal -    HENT:   [x] Normocephalic, atraumatic.   [] Abnormal   [x] Mouth/Throat: Mucous membranes are moist.     External Ears [x] Normal  [] Abnormal-     Neck: [x] No visualized mass     Pulmonary/Chest: [x] Respiratory effort normal.  [x] No visualized signs of difficulty breathing or respiratory distress        [] Abnormal-      Musculoskeletal:   [x] Normal gait with no signs of ataxia         [x] Normal range of motion of neck        [] Abnormal-       Neurological:        [x] No Facial Asymmetry (Cranial nerve 7 motor function) (limited exam to video visit)          [x] No gaze palsy        [] Abnormal-         Skin:        [x] No significant exanthematous lesions or discoloration noted on facial skin         [] Abnormal-            Psychiatric:       [x] Normal Affect [x] No Hallucinations        [] Abnormal-     Other pertinent observable physical exam findings-     ASSESSMENT/PLAN:  1. Loss of taste  - COVID-19 Ambulatory; Future    2. Loss of smell  - COVID-19 Ambulatory; Future    3. Fever, unspecified fever cause    4. Viral illness    - Pt would like testing in [place] with [facility]. Contacted that office and order faxed to number provided. - Pt instructed to insolate until results are back. - Rest and increase fluids  - Tylenol and ibuprofen as needed for pain and fever   - ER if SOB or chest pain develop. Pt was agreeable to this. Return if symptoms worsen or fail to improve. Milena Mccartney is a 72 y.o. female being evaluated by a Virtual Visit (video visit) encounter to address concerns as mentioned above. A caregiver was present when appropriate. Due to this being a TeleHealth encounter (During [facility]-64 public health emergency), evaluation of the following organ systems was limited: Vitals/Constitutional/EENT/Resp/CV/GI//MS/Neuro/Skin/Heme-Lymph-Imm.   Pursuant to the emergency declaration under the [address] [facility], [address] [place] [address] [facility] authority and the [employer] Act, this Virtual Visit was conducted with patient's (and/or legal guardian's) consent, to reduce the patient's risk

## 2020-10-19 NOTE — PATIENT INSTRUCTIONS
avoid crowds and try to stay at least 6 feet away from other people. · Avoid contact with pets and other animals. · Cover your mouth and nose with a tissue when you cough or sneeze. Then throw it in the trash right away. · Wash your hands often, especially after you cough or sneeze. Use soap and water, and scrub for at least 20 seconds. If soap and water aren't available, use an alcohol-based hand . · Don't share personal household items. These include bedding, towels, cups and glasses, and eating utensils. · [address] in the warmest water allowed for the fabric type, and dry it completely. It's okay to wash other people's laundry with yours. · Clean and disinfect your home every day. Use household  and disinfectant wipes or sprays. Take special care to clean things that you grab with your hands. These include doorknobs, remote controls, phones, and handles on your refrigerator and microwave. And don't forget countertops, tabletops, bathrooms, and computer keyboards. When you can end self-isolation  · If you know or suspect that you have COVID-19, stay in self-isolation until:  ? You haven't had a fever for 3 days, and  ? Your symptoms have improved, and  ? It's been at least 10 days since your symptoms started. · Talk to your doctor about whether you also need testing, especially if you have a weakened immune system. When should you call for help? Call 911 anytime you think you may need emergency care. For example, call if you have life-threatening symptoms, such as:    · You have severe trouble breathing. (You can't talk at all.)     · You have constant chest pain or pressure.     · You are severely dizzy or lightheaded.     · You are confused or can't think clearly.     · Your face and lips have a blue color.     · You pass out (lose consciousness) or are very hard to wake up. Call your doctor now or seek immediate medical care if:    · You have moderate trouble breathing.  (You can't speak a full sentence.)     · You are coughing up blood (more than about 1 teaspoon).     · You have signs of low blood pressure. These include feeling lightheaded; being too weak to stand; and having cold, pale, clammy skin. Watch closely for changes in your health, and be sure to contact your doctor if:    · Your symptoms get worse.     · You are not getting better as expected. Call before you go to the doctor's office. Follow their instructions. And wear a cloth face cover. Current as of: July 10, 2020               Content Version: 12.6  © 2006-2020 [employer]. Care instructions adapted under license by [facility] ([facility]). If you have questions about a medical condition or this instruction, always ask your healthcare professional. [place] [address] any warranty or liability for your use of this information. Patient Education        Learning About Fever  What is a fever? A fever is a high body temperature. It's one way your body fights being sick. A fever shows that the body is responding to infection or other illnesses, both minor and severe. A fever is a symptom, not an illness by itself. A fever can be a sign that you are ill, but most fevers are not caused by a serious problem. You may have a fever with a minor illness, such as a cold. But sometimes a very serious infection may cause little or no fever. It is important to look at other symptoms, other conditions you have, and how you feel in general. In children, notice how they act and see what symptoms they complain of. What is a normal body temperature? A normal body temperature is about 98. 6ºF. Some people have a normal temperature that is a little higher or a little lower than this. Your temperature may be a little lower in the morning than it is later in the day. It may go up during hot weather or when you exercise, wear heavy clothes, or take a hot bath.   Your temperature may also be different depending on how you take it. A temperature taken in the mouth (oral) or under the arm may be a little lower than your core temperature (rectal). What is a fever temperature? A core temperature of 100.4°F or above is considered a fever. What can cause a fever? A fever may be caused by:  · Infections. This is the most common cause of a fever. Examples of infections that can cause a fever include the flu, a kidney infection, or pneumonia. · Some medicines. · Severe trauma or injury, such as a heart attack, stroke, heatstroke, or burns. · Other medical conditions, such as arthritis and some cancers. How can you treat a fever at home? · Ask your doctor if you can take an over-the-counter pain medicine, such as acetaminophen (Tylenol), ibuprofen (Advil, Motrin), or naproxen (Aleve). Be safe with medicines. Read and follow all instructions on the label. · To prevent dehydration, drink plenty of fluids. Choose water and other caffeine-free clear liquids until you feel better. If you have kidney, heart, or liver disease and have to limit fluids, talk with your doctor before you increase the amount of fluids you drink. Follow-up care is a key part of your treatment and safety. Be sure to make and go to all appointments, and call your doctor if you are having problems. It's also a good idea to know your test results and keep a list of the medicines you take. Where can you learn more? Go to [email or web address] org and sign in to your [employer] account. Enter R193 in the [facility] box to learn more about \"Learning About Fever. \"     If you do not have an account, please click on the \"Sign Up Now\" link. Current as of: June 26, 2019               Content Version: 12.6  © 7937-2198 [employer]. Care instructions adapted under license by [facility] ([facility]).  If you have questions about a medical condition or this instruction, always ask your healthcare professional. [employer] disclaims any warranty or liability for your use of this information.

## 2020-10-23 ENCOUNTER — TELEPHONE (OUTPATIENT)
Dept: FAMILY MEDICINE CLINIC | Age: 65
End: 2020-10-23

## 2020-10-23 RX ORDER — ONDANSETRON 4 MG/1
4 TABLET, FILM COATED ORAL 3 TIMES DAILY PRN
Qty: 30 TABLET | Refills: 0 | Status: SHIPPED | OUTPATIENT
Start: 2020-10-23 | End: 2021-10-28

## 2020-10-23 NOTE — TELEPHONE ENCOUNTER
OK for prescription for zofran sent to pharmacy listed.   Monitor symptoms and Call office with any questions or concerns, or if symptoms are getting worse or changing. -WS    Orders Placed This Encounter   Medications    ondansetron (ZOFRAN) 4 MG tablet     Sig: Take 1 tablet by mouth 3 times daily as needed for Nausea or Vomiting     Dispense:  30 tablet     Refill:  0

## 2020-10-23 NOTE — TELEPHONE ENCOUNTER
Patient recently tested COVID positive. She is having a lot of nausea and wondering if you would prescribe something for this. Pharmacy info entered. [facility]  Ok for Zofran?

## 2020-10-28 ENCOUNTER — TELEPHONE (OUTPATIENT)
Dept: FAMILY MEDICINE CLINIC | Age: 65
End: 2020-10-28

## 2020-10-28 NOTE — TELEPHONE ENCOUNTER
Pt tested positive for COVID on 10/19/20, had test done in [facility] (result in [address]). Pt has never been contacted by the Health Department (lives in [place]) and has left messages with them. She has completed the 10 day quarantine beginning from symptom onset (10/18) and is ready to go back to work on 10/30/20. No fever, cough or diarrhea. OK for letter? Please advise. OK to email to pt at [email or web address]. No need to call the pt back unless the letter will not be written.

## 2020-10-28 NOTE — TELEPHONE ENCOUNTER
Letter on CG desk for signature. It will be emailed to the address given tomorrow after it is signed.

## 2020-10-28 NOTE — TELEPHONE ENCOUNTER
[id number] Katerine Arteaga to return to work 10/30/20 as long as it has been more than 10 days from symptom onset and she has been afebrile for at least 24 hours.  CG

## 2020-10-28 NOTE — LETTER
[address] [address]  Phone: [phone]  Fax: [phone]    Christy Pugh MD        October 28, 2020     Patient: Milena Mccartney   YOB: 1955       To Whom It May Concern: It is my medical opinion that Wilma Gonzalez may return to work on 10/30/20 without restriction. She tested positive for COVID-19 recently and has completed the recommended quarantine guideline and is fever free. If you have any questions or concerns, please don't hesitate to call.     Sincerely,        Christy Pugh MD

## 2021-01-04 DIAGNOSIS — F51.01 PRIMARY INSOMNIA: ICD-10-CM

## 2021-01-04 RX ORDER — ZOLPIDEM TARTRATE 5 MG/1
5 TABLET ORAL NIGHTLY PRN
Qty: 30 TABLET | Refills: 0 | Status: SHIPPED | OUTPATIENT
Start: 2021-01-04 | End: 2021-05-18 | Stop reason: SDUPTHER

## 2021-01-04 NOTE — TELEPHONE ENCOUNTER
Zoe Parks called requesting a refill on the following medications:  Requested Prescriptions      No prescriptions requested or ordered in this encounter   AMBIEN 5MG? Pharmacy verified:[facility]  . pv      Date of last visit:   Date of next visit (if applicable): Visit date not found

## 2021-01-04 NOTE — TELEPHONE ENCOUNTER
Prescription sent to the pharmacy as below. Please notify the patient. Requested Prescriptions     Signed Prescriptions Disp Refills    zolpidem (AMBIEN) 5 MG tablet 30 tablet 0     Sig: Take 1 tablet by mouth nightly as needed for Sleep for up to 30 doses. Authorizing Provider: Ismael Abarca report reviewed and no contraindications or problems noted.           Electronically signed by Dilcia Sandoval MD on 1/4/2021 at 12:42 PM

## 2021-03-12 LAB
CHOLESTEROL, TOTAL: 166 MG/DL
CHOLESTEROL/HDL RATIO: ABNORMAL
HDLC SERPL-MCNC: 32 MG/DL (ref 35–70)
LDL CHOLESTEROL CALCULATED: 106 MG/DL (ref 0–160)
NONHDLC SERPL-MCNC: ABNORMAL MG/DL
TRIGL SERPL-MCNC: 138 MG/DL
VLDLC SERPL CALC-MCNC: 28 MG/DL

## 2021-03-15 ENCOUNTER — TELEPHONE (OUTPATIENT)
Dept: FAMILY MEDICINE CLINIC | Age: 66
End: 2021-03-15

## 2021-03-15 DIAGNOSIS — R73.01 IFG (IMPAIRED FASTING GLUCOSE): ICD-10-CM

## 2021-03-15 DIAGNOSIS — R74.01 ELEVATED AST (SGOT): Primary | ICD-10-CM

## 2021-03-15 DIAGNOSIS — R74.01 ELEVATED ALT MEASUREMENT: ICD-10-CM

## 2021-03-15 NOTE — TELEPHONE ENCOUNTER
Patient notified. States that she does not use Tylenol and usually only drinks alcohol once a month. She is willing to repeat the AST/ALT as instructed but wants to know if the A1C could be done at that time also or if she needs to do it now. Please advise. Call back tomorrow and leave detailed message.

## 2021-04-19 ENCOUNTER — OFFICE VISIT (OUTPATIENT)
Dept: FAMILY MEDICINE CLINIC | Age: 66
End: 2021-04-19
Payer: MEDICARE

## 2021-04-19 VITALS
HEART RATE: 70 BPM | SYSTOLIC BLOOD PRESSURE: 122 MMHG | BODY MASS INDEX: 32.55 KG/M2 | DIASTOLIC BLOOD PRESSURE: 76 MMHG | TEMPERATURE: 97.8 F | WEIGHT: 193.8 LBS | RESPIRATION RATE: 16 BRPM

## 2021-04-19 DIAGNOSIS — L24.9 IRRITANT DERMATITIS: Primary | ICD-10-CM

## 2021-04-19 PROCEDURE — 99213 OFFICE O/P EST LOW 20 MIN: CPT | Performed by: FAMILY MEDICINE

## 2021-04-19 RX ORDER — TRIAMCINOLONE ACETONIDE 1 MG/G
CREAM TOPICAL
Qty: 30 G | Refills: 0 | Status: SHIPPED | OUTPATIENT
Start: 2021-04-19 | End: 2021-10-28

## 2021-04-19 ASSESSMENT — ENCOUNTER SYMPTOMS
BACK PAIN: 0
RESPIRATORY NEGATIVE: 1
ABDOMINAL PAIN: 0

## 2021-04-19 ASSESSMENT — PATIENT HEALTH QUESTIONNAIRE - PHQ9
SUM OF ALL RESPONSES TO PHQ9 QUESTIONS 1 & 2: 0
SUM OF ALL RESPONSES TO PHQ QUESTIONS 1-9: 0
1. LITTLE INTEREST OR PLEASURE IN DOING THINGS: 0

## 2021-04-19 NOTE — PROGRESS NOTES
used to authenticate this note.         Electronically signed by Ramon Langston MD on 4/19/2021 at 5:12 PM

## 2021-05-08 LAB
AVERAGE GLUCOSE: 128
HBA1C MFR BLD: 6.1 %

## 2021-05-12 ENCOUNTER — TELEPHONE (OUTPATIENT)
Dept: FAMILY MEDICINE CLINIC | Age: 66
End: 2021-05-12

## 2021-05-12 NOTE — TELEPHONE ENCOUNTER
----- Message from Alfredo Bell MD sent at 5/11/2021 12:09 PM EDT -----  HbA1C in the prediabetes range. Have her start an ADA diet and increase exercise to reduce weight and blood sugars. LFTs remain mildly elevated. Check liver ultrasound. Reducing weight will likely help her liver tests, too.  CG

## 2021-05-12 NOTE — TELEPHONE ENCOUNTER
Pt notified of the lab results and CG recommendations. She has an appt with CG on 5/18/21 and would like to discuss the liver ultrasound at that time as well as the information she needs regarding the prediabetes. Appt desk updated.

## 2021-05-18 ENCOUNTER — OFFICE VISIT (OUTPATIENT)
Dept: FAMILY MEDICINE CLINIC | Age: 66
End: 2021-05-18
Payer: MEDICARE

## 2021-05-18 VITALS
HEIGHT: 64 IN | WEIGHT: 193.4 LBS | SYSTOLIC BLOOD PRESSURE: 122 MMHG | OXYGEN SATURATION: 96 % | DIASTOLIC BLOOD PRESSURE: 80 MMHG | BODY MASS INDEX: 33.02 KG/M2 | HEART RATE: 66 BPM

## 2021-05-18 DIAGNOSIS — Z00.00 ANNUAL PHYSICAL EXAM: Primary | ICD-10-CM

## 2021-05-18 DIAGNOSIS — R74.01 ELEVATED AST (SGOT): ICD-10-CM

## 2021-05-18 DIAGNOSIS — F51.01 PRIMARY INSOMNIA: ICD-10-CM

## 2021-05-18 DIAGNOSIS — Z12.31 ENCOUNTER FOR SCREENING MAMMOGRAM FOR MALIGNANT NEOPLASM OF BREAST: ICD-10-CM

## 2021-05-18 DIAGNOSIS — R73.01 IFG (IMPAIRED FASTING GLUCOSE): ICD-10-CM

## 2021-05-18 DIAGNOSIS — I10 ESSENTIAL HYPERTENSION: Chronic | ICD-10-CM

## 2021-05-18 DIAGNOSIS — E78.5 HYPERLIPIDEMIA, UNSPECIFIED HYPERLIPIDEMIA TYPE: ICD-10-CM

## 2021-05-18 DIAGNOSIS — R74.01 ELEVATED ALT MEASUREMENT: ICD-10-CM

## 2021-05-18 PROCEDURE — G0402 INITIAL PREVENTIVE EXAM: HCPCS | Performed by: FAMILY MEDICINE

## 2021-05-18 RX ORDER — ZOLPIDEM TARTRATE 5 MG/1
5 TABLET ORAL NIGHTLY PRN
Qty: 30 TABLET | Refills: 0 | Status: SHIPPED | OUTPATIENT
Start: 2021-05-18 | End: 2022-01-06 | Stop reason: SDUPTHER

## 2021-05-18 RX ORDER — LISINOPRIL 20 MG/1
20 TABLET ORAL DAILY
Qty: 90 TABLET | Refills: 3 | Status: SHIPPED | OUTPATIENT
Start: 2021-05-18 | End: 2022-06-27 | Stop reason: SDUPTHER

## 2021-05-18 RX ORDER — PRAVASTATIN SODIUM 40 MG
40 TABLET ORAL DAILY
Qty: 90 TABLET | Refills: 3 | Status: SHIPPED | OUTPATIENT
Start: 2021-05-18 | End: 2022-06-27 | Stop reason: SDUPTHER

## 2021-05-18 ASSESSMENT — ENCOUNTER SYMPTOMS
VOMITING: 0
EYES NEGATIVE: 1
DIARRHEA: 0
BLOOD IN STOOL: 0
ABDOMINAL PAIN: 0
SHORTNESS OF BREATH: 0
NAUSEA: 0

## 2021-05-18 ASSESSMENT — VISUAL ACUITY
OD_CC: 20/25
OS_CC: 20/40

## 2021-05-18 NOTE — PROGRESS NOTES
2021    Zoe Parks (:  1955) is a 72 y.o. female, here for a preventive medicine evaluation. 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 She has been feeling well. LFTs still elevated and her sugars are in the prediabetes range. BPs stable. Exercising some with walking. Takes all meds as directed and denies side effects. No recent illnesses or hospitalizations. No changes in family history. Nonsmoker. Body mass index is 33.63 kg/m². Review of Systems   Constitutional: Negative for chills, fatigue and fever. HENT: Negative. Eyes: Negative. Respiratory: Negative for shortness of breath. Cardiovascular: Negative for chest pain, palpitations and leg swelling. Gastrointestinal: Negative for abdominal pain, blood in stool, diarrhea, nausea and vomiting. Genitourinary: Negative for dysuria. Musculoskeletal: Negative for arthralgias and myalgias. Skin: Negative for rash. Neurological: Negative for dizziness and headaches. Hematological: Negative for adenopathy. Psychiatric/Behavioral: Negative. All other systems reviewed and are negative. Prior to Visit Medications    Medication Sig Taking? Authorizing Provider   zolpidem (AMBIEN) 5 MG tablet Take 1 tablet by mouth nightly as needed for Sleep for up to 30 doses. Yes Fran Han MD   lisinopril (PRINIVIL;ZESTRIL) 20 MG tablet Take 1 tablet by mouth daily Yes Fran Han MD   pravastatin (PRAVACHOL) 40 MG tablet Take 1 tablet by mouth daily Yes Fran Han MD   triamcinolone (KENALOG) 0.1 % cream Apply topically 2 times daily.  Yes Fran Han MD   ondansetron (ZOFRAN) 4 MG tablet Take 1 tablet by mouth 3 times daily as needed for Nausea or Vomiting Yes NAPOLEON Mccoy - CNP History   Problem Relation Age of Onset    Diabetes Mother     High Blood Pressure Mother     Vision Loss Mother     Arthritis Neg Hx     Asthma Neg Hx     Birth Defects Neg Hx     Cancer Neg Hx     Depression Neg Hx     Early Death Neg Hx     Hearing Loss Neg Hx     Heart Disease Neg Hx     High Cholesterol Neg Hx     Learning Disabilities Neg Hx     Kidney Disease Neg Hx     Mental Illness Neg Hx     Mental Retardation Neg Hx     Miscarriages / Stillbirths Neg Hx     Stroke Neg Hx     Substance Abuse Neg Hx     Other Neg Hx        Vitals:    05/18/21 1255   BP: 122/80   Site: Right Upper Arm   Pulse: 66   SpO2: 96%   Weight: 193 lb 6.4 oz (87.7 kg)   Height: 5' 3.58\" (1.615 m)     Estimated body mass index is 33.63 kg/m² as calculated from the following:    Height as of this encounter: 5' 3.58\" (1.615 m). Weight as of this encounter: 193 lb 6.4 oz (87.7 kg). Physical Exam  Vitals and nursing note reviewed. Constitutional:       General: She is not in acute distress. Appearance: She is well-developed. HENT:      Head: Normocephalic and atraumatic. Right Ear: Tympanic membrane normal.      Left Ear: Tympanic membrane normal.      Mouth/Throat:      Mouth: Mucous membranes are moist.      Pharynx: No posterior oropharyngeal erythema. Eyes:      Conjunctiva/sclera: Conjunctivae normal.   Neck:      Thyroid: No thyromegaly. Vascular: No carotid bruit. Cardiovascular:      Rate and Rhythm: Normal rate and regular rhythm. Heart sounds: No murmur heard. Pulmonary:      Effort: Pulmonary effort is normal.      Breath sounds: Normal breath sounds. No wheezing. Abdominal:      General: Bowel sounds are normal.      Palpations: Abdomen is soft. Tenderness: There is no abdominal tenderness. There is no guarding or rebound. Musculoskeletal:      Cervical back: Neck supple. Right lower leg: No edema. Left lower leg: No edema.    Lymphadenopathy: Cervical: No cervical adenopathy. Skin:     General: Skin is warm and dry. Findings: No rash. Neurological:      Mental Status: She is alert and oriented to person, place, and time. Psychiatric:         Behavior: Behavior normal.                     The 10-year ASCVD risk score (Vivienne Neely, et al., 2013) is: 7.7%    Values used to calculate the score:      Age: 72 years      Sex: Female      Is Non- : No      Diabetic: No      Tobacco smoker: No      Systolic Blood Pressure: 397 mmHg      Is BP treated: Yes      HDL Cholesterol: 32 mg/dL      Total Cholesterol: 166 mg/dL    Immunization History   Administered Date(s) Administered    PPD Test 08/13/2012    Tdap (Boostrix, Adacel) 08/13/2012       Health Maintenance   Topic Date Due    Hepatitis C screen  Never done    COVID-19 Vaccine (1) Never done    HIV screen  Never done    Shingles Vaccine (1 of 2) Never done    DEXA (modify frequency per FRAX score)  Never done    Potassium monitoring  03/23/2016    Creatinine monitoring  03/23/2016    Cervical cancer screen  05/12/2018    Annual Wellness Visit (AWV)  Never done    Pneumococcal 65+ years Vaccine (1 of 1 - PPSV23) Never done    Flu vaccine (Season Ended) 09/01/2021    Breast cancer screen  09/02/2021    Lipid screen  03/12/2022    A1C test (Diabetic or Prediabetic)  05/08/2022    Colon cancer screen colonoscopy  08/02/2022    DTaP/Tdap/Td vaccine (2 - Td) 08/13/2022    Hepatitis A vaccine  Aged Out    Hepatitis B vaccine  Aged Out    Hib vaccine  Aged Out    Meningococcal (ACWY) vaccine  Aged Out          ASSESSMENT/PLAN:    1. Annual physical exam  2. Primary insomnia  -     zolpidem (AMBIEN) 5 MG tablet; Take 1 tablet by mouth nightly as needed for Sleep for up to 30 doses. , Disp-30 tablet, R-0Normal  3. Essential hypertension  -     lisinopril (PRINIVIL;ZESTRIL) 20 MG tablet; Take 1 tablet by mouth daily, Disp-90 tablet, R-3Normal  4.  Hyperlipidemia, unspecified hyperlipidemia type  -     pravastatin (PRAVACHOL) 40 MG tablet; Take 1 tablet by mouth daily, Disp-90 tablet, R-3Normal  5. Elevated ALT measurement  -     AST; Future  -     ALT; Future  6. Elevated AST (SGOT)  -     AST; Future  -     ALT; Future  7. 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 No restrictions. Work on diet and exercise to reduce weights and sugars    Med refills as above    Mammogram this summer    OARRS report reviewed and no contraindications or problems noted. Repeat LFTs and HbA1C in 3 months    Pneumovax and covid vaccines suggested    Follow up yearly and prn       An electronic signature was used to authenticate this note.     --Job Caraballo MD on 5/18/2021 at 1:34 PM

## 2021-08-02 LAB
AVERAGE GLUCOSE: 123
HBA1C MFR BLD: 5.9 %

## 2021-08-04 ENCOUNTER — TELEPHONE (OUTPATIENT)
Dept: FAMILY MEDICINE CLINIC | Age: 66
End: 2021-08-04

## 2021-08-04 NOTE — TELEPHONE ENCOUNTER
COPIED Cathi 63    Please notify the patient that her blood sugars and liver function tests are both improved.  Have her continue current.  MALLY

## 2021-09-21 ENCOUNTER — VIRTUAL VISIT (OUTPATIENT)
Dept: FAMILY MEDICINE CLINIC | Age: 66
End: 2021-09-21
Payer: MEDICARE

## 2021-09-21 DIAGNOSIS — J06.9 VIRAL URI: Primary | ICD-10-CM

## 2021-09-21 PROCEDURE — 99442 PR PHYS/QHP TELEPHONE EVALUATION 11-20 MIN: CPT | Performed by: NURSE PRACTITIONER

## 2021-09-21 RX ORDER — DEXTROMETHORPHAN HYDROBROMIDE AND PROMETHAZINE HYDROCHLORIDE 15; 6.25 MG/5ML; MG/5ML
5 SYRUP ORAL 4 TIMES DAILY PRN
Qty: 180 ML | Refills: 0 | Status: SHIPPED | OUTPATIENT
Start: 2021-09-21 | End: 2021-09-30

## 2021-09-21 RX ORDER — LORATADINE AND PSEUDOEPHEDRINE SULFATE 5; 120 MG/1; MG/1
1 TABLET, EXTENDED RELEASE ORAL 2 TIMES DAILY
Qty: 20 TABLET | Refills: 0 | Status: SHIPPED | OUTPATIENT
Start: 2021-09-21 | End: 2021-10-28

## 2021-09-21 ASSESSMENT — ENCOUNTER SYMPTOMS
RHINORRHEA: 1
NAUSEA: 0
SHORTNESS OF BREATH: 0
CONSTIPATION: 0
COUGH: 1
DIARRHEA: 0
BLOOD IN STOOL: 0
SINUS PRESSURE: 1
VOMITING: 0

## 2021-09-21 NOTE — PATIENT INSTRUCTIONS
rest.  · Do not smoke or allow others to smoke around you. If you need help quitting, talk to your doctor about stop-smoking programs and medicines. These can increase your chances of quitting for good. When should you call for help? Call 911 anytime you think you may need emergency care. For example, call if:    · You have severe trouble breathing. Call your doctor now or seek immediate medical care if:    · You seem to be getting much sicker.     · You have new or worse trouble breathing.     · You have a new or higher fever.     · You have a new rash. Watch closely for changes in your health, and be sure to contact your doctor if:    · You have a new symptom, such as a sore throat, an earache, or sinus pain.     · You cough more deeply or more often, especially if you notice more mucus or a change in the color of your mucus.     · You do not get better as expected. Where can you learn more? Go to https://Luxanova.Handmade Mobile. org and sign in to your BuyBox account. Enter E398 in the xLander.ru box to learn more about \"Upper Respiratory Infection (Cold): Care Instructions. \"     If you do not have an account, please click on the \"Sign Up Now\" link. Current as of: October 26, 2020               Content Version: 12.9  © 2006-2021 Healthwise, Incorporated. Care instructions adapted under license by [facility] ([facility]). If you have questions about a medical condition or this instruction, always ask your healthcare professional. [place] [address] any warranty or liability for your use of this information.

## 2021-09-21 NOTE — PROGRESS NOTES
tablet 3    pravastatin (PRAVACHOL) 40 MG tablet Take 1 tablet by mouth daily 90 tablet 3    triamcinolone (KENALOG) 0.1 % cream Apply topically 2 times daily. 30 g 0    ondansetron (ZOFRAN) 4 MG tablet Take 1 tablet by mouth 3 times daily as needed for Nausea or Vomiting 30 tablet 0    aspirin 81 MG tablet Take 81 mg by mouth daily       No current facility-administered medications for this visit. Review of Systems   Constitutional: Positive for fatigue. Negative for chills, diaphoresis and fever. HENT: Positive for congestion, rhinorrhea, sinus pressure and sneezing. Respiratory: Positive for cough. Negative for shortness of breath. Cardiovascular: Negative for chest pain, palpitations and leg swelling. Gastrointestinal: Negative for blood in stool, constipation, diarrhea, nausea and vomiting. Genitourinary: Negative for dysuria and hematuria. Musculoskeletal: Negative for myalgias. Neurological: Positive for headaches. Negative for dizziness. All other systems reviewed and are negative. OBJECTIVE     Due to this being a TeleHealth encounter, evaluation of the following organ systems is limited: Vitals/Constitutional/EENT/Resp/CV/GI//MS/Neuro/Skin/Heme-Lymph-Imm. PHYSICAL EXAMINATION:  [ INSTRUCTIONS:  \"[x]\" Indicates a positive item  \"[]\" Indicates a negative item  -- DELETE ALL ITEMS NOT EXAMINED]  Vital Signs: (All Vital Signs are pt reported, unless otherwise noted)   There were no vitals taken for this visit.       Constitutional: [] Appears well-developed and well-nourished [x] No apparent distress      [] Abnormal-   Mental status  [x] Alert and awake  [x] Oriented to person/place/time []Able to follow commands          Lab Results   Component Value Date    LABA1C 5.9 08/02/2021     No results found for: EAG    Lab Results   Component Value Date    CHOL 166 03/12/2021    TRIG 138 03/12/2021    HDL 32 (A) 03/12/2021    LDLCALC 106 03/12/2021       No results found for: NA, K, CL, CO2, BUN, CREATININE, GLUCOSE, CALCIUM, PROT, LABALBU, BILITOT, ALKPHOS, AST, ALT, LABGLOM, GFRAA, AGRATIO, GLOB    No results found for: LABMICR, KDAC55IZE    No results found for: TSH, G6SMEYV, W6ZWKML, THYROIDAB, FT3, T4FREE    No results found for: WBC, HGB, HCT, MCV, PLT    No results found for: PSA, PSADIA      Immunization History   Administered Date(s) Administered    PPD Test 08/13/2012    Tdap (Boostrix, Adacel) 08/13/2012       Health Maintenance   Topic Date Due    Hepatitis C screen  Never done    COVID-19 Vaccine (1) Never done    Shingles Vaccine (1 of 2) Never done    DEXA (modify frequency per FRAX score)  Never done    Potassium monitoring  03/23/2016    Creatinine monitoring  03/23/2016    Pneumococcal 65+ years Vaccine (1 of 1 - PPSV23) Never done    Flu vaccine (1) Never done    Breast cancer screen  09/02/2021    Lipid screen  03/12/2022    Annual Wellness Visit (AWV)  05/19/2022    A1C test (Diabetic or Prediabetic)  08/02/2022    Colon cancer screen colonoscopy  08/02/2022    DTaP/Tdap/Td vaccine (2 - Td or Tdap) 08/13/2022    Hepatitis A vaccine  Aged Out    Hepatitis B vaccine  Aged Out    Hib vaccine  Aged Out    Meningococcal (ACWY) vaccine  Aged Out         No future appointments. ASSESSMENT       Diagnosis Orders   1.  Viral URI  promethazine-dextromethorphan (PROMETHAZINE-DM) 6.25-15 MG/5ML syrup    loratadine-pseudoephedrine (CLARITIN-D 12 HOUR) 5-120 MG per extended release tablet       PLAN     Pt to notify office of covid results  Viral nature of symptoms discussed  Symptomatic Care  Increase fluids and rest  RTO if symptoms worsen or stay the same    19}    Pursuant to the emergency declaration under the [facility] [place]note 2[place], 1135 waiver authority and the [employer] Act, this Virtual  Visit was conducted, with patient's consent, to reduce the patient's risk of exposure to COVID-19 and provide continuity of care for an established patient. Services were provided through a video synchronous discussion virtually to substitute for in-person clinic visit. Electronically signed by NAPOLEON Hatch CNP on 9/21/2021 at 11:28 AM    Greater than 20 minutes was spent in contact with patient with greater than 50% in counseling and coordination of care.

## 2021-10-28 ENCOUNTER — OFFICE VISIT (OUTPATIENT)
Dept: FAMILY MEDICINE CLINIC | Age: 66
End: 2021-10-28
Payer: MEDICARE

## 2021-10-28 VITALS
HEART RATE: 68 BPM | RESPIRATION RATE: 16 BRPM | WEIGHT: 186 LBS | SYSTOLIC BLOOD PRESSURE: 128 MMHG | TEMPERATURE: 98 F | BODY MASS INDEX: 32.35 KG/M2 | DIASTOLIC BLOOD PRESSURE: 80 MMHG

## 2021-10-28 DIAGNOSIS — H61.21 IMPACTED CERUMEN OF RIGHT EAR: Primary | ICD-10-CM

## 2021-10-28 PROCEDURE — 99213 OFFICE O/P EST LOW 20 MIN: CPT | Performed by: NURSE PRACTITIONER

## 2021-10-28 ASSESSMENT — ENCOUNTER SYMPTOMS
DIARRHEA: 0
CONSTIPATION: 0
VOMITING: 0
SHORTNESS OF BREATH: 0
BLOOD IN STOOL: 0
NAUSEA: 0

## 2021-10-28 NOTE — PROGRESS NOTES
Per order of Kymberly Nolen, CNP a left ear irrigation was performed using warm water. A moderate amount of cerumen was removed, cerumen spoon and Alligator forceps used. TM visualized. Pt tolerated well.

## 2021-10-28 NOTE — PROGRESS NOTES
Chief Complaint   Patient presents with    Ear Fullness     C/O right ear fullness, feels plugged. No pain. SUBJECTIVE     Devin Alexis is a 77 y. o.female      Pt presents with complaints of right ear fullness for the last 3 weeks. She denies any pain or drainage from the ear. No current sinus issues. Patient reports she has a history of cerumen impaction and had to have her ears irrigated in the past.    Review of Systems   Constitutional: Negative for chills, diaphoresis and fever. HENT:        Right ear fullness   Respiratory: Negative for shortness of breath. Cardiovascular: Negative for chest pain, palpitations and leg swelling. Gastrointestinal: Negative for blood in stool, constipation, diarrhea, nausea and vomiting. Genitourinary: Negative for dysuria and hematuria. Musculoskeletal: Negative for myalgias. Neurological: Negative for dizziness and headaches. All other systems reviewed and are negative. OBJECTIVE     /80 (Site: Left Upper Arm)   Pulse 68   Temp 98 °F (36.7 °C) (Oral)   Resp 16   Wt 186 lb (84.4 kg)   BMI 32.35 kg/m²     Physical Exam  Vitals and nursing note reviewed. Constitutional:       Appearance: She is well-developed. HENT:      Head: Normocephalic and atraumatic. Right Ear: External ear normal. There is impacted cerumen. Left Ear: Tympanic membrane and external ear normal.      Nose: Nose normal.   Eyes:      Conjunctiva/sclera: Conjunctivae normal.      Pupils: Pupils are equal, round, and reactive to light. Cardiovascular:      Rate and Rhythm: Normal rate and regular rhythm. Heart sounds: Normal heart sounds. Pulmonary:      Effort: Pulmonary effort is normal.      Breath sounds: Normal breath sounds. Abdominal:      General: Bowel sounds are normal.      Palpations: Abdomen is soft. Musculoskeletal:         General: Normal range of motion. Cervical back: Normal range of motion and neck supple.    Skin: General: Skin is warm and dry. Neurological:      Mental Status: She is alert and oriented to person, place, and time. Deep Tendon Reflexes: Reflexes are normal and symmetric. Psychiatric:         Behavior: Behavior normal.         Thought Content: Thought content normal.         Judgment: Judgment normal.           No results found for this visit on 10/28/21. ASSESSMENT       Diagnosis Orders   1.  Impacted cerumen of right ear         PLAN     Right ear irrigated  Follow up as needed          Electronically signed by NAPOLEON Thompson CNP on 10/28/2021 at 3:02 PM

## 2021-10-28 NOTE — PATIENT INSTRUCTIONS
Patient Education        Earwax Blockage: Care Instructions  Your Care Instructions     Earwax is a natural substance that protects the ear canal. Normally, earwax drains from the ears and does not cause problems. Sometimes earwax builds up and hardens. Earwax blockage (also called cerumen impaction) can cause some loss of hearing and pain. When wax is tightly packed, you will need to have your doctor remove it. Follow-up care is a key part of your treatment and safety. Be sure to make and go to all appointments, and call your doctor if you are having problems. It's also a good idea to know your test results and keep a list of the medicines you take. How can you care for yourself at home? · Do not try to remove earwax with cotton swabs, fingers, or other objects. This can make the blockage worse and damage the eardrum. · If your doctor recommends that you try to remove earwax at home:  ? Soften and loosen the earwax with warm mineral oil. You also can try hydrogen peroxide mixed with an equal amount of room temperature water. Place 2 drops of the fluid, warmed to body temperature, in the ear two times a day for up to 5 days. ? Once the wax is loose and soft, all that is usually needed to remove it from the ear canal is a gentle, warm shower. Direct the water into the ear, then tip your head to let the earwax drain out. Dry your ear thoroughly with a hair dryer set on low. Hold the dryer several inches from your ear. ? If the warm mineral oil and shower do not work, use an over-the-counter wax softener. Read and follow all instructions on the label. After using the wax softener, use an ear syringe to gently flush the ear. Make sure the flushing solution is body temperature. Cool or hot fluids in the ear can cause dizziness. When should you call for help?    Call your doctor now or seek immediate medical care if:    · Pus or blood drains from your ear.     · Your ears are ringing or feel full.     · You have a loss of hearing. Watch closely for changes in your health, and be sure to contact your doctor if:    · You have pain or reduced hearing after 1 week of home treatment.     · You have any new symptoms, such as nausea or balance problems. Where can you learn more? Go to https://chpezoieeweb.Lingua.ly. org and sign in to your [employer] account. Enter J294 in the Auto Load Logic box to learn more about \"Earwax Blockage: Care Instructions. \"     If you do not have an account, please click on the \"Sign Up Now\" link. Current as of: July 1, 2021               Content Version: 13.0  © 5222-3103 Healthwise, Incorporated. Care instructions adapted under license by [facility] [place] ([facility]). If you have questions about a medical condition or this instruction, always ask your healthcare professional. [address] any warranty or liability for your use of this information.

## 2022-01-06 DIAGNOSIS — F51.01 PRIMARY INSOMNIA: ICD-10-CM

## 2022-01-06 RX ORDER — ZOLPIDEM TARTRATE 5 MG/1
5 TABLET ORAL NIGHTLY PRN
Qty: 30 TABLET | Refills: 0 | Status: SHIPPED | OUTPATIENT
Start: 2022-01-06 | End: 2022-08-09 | Stop reason: SDUPTHER

## 2022-01-06 NOTE — TELEPHONE ENCOUNTER
This medication refill is regarding a refill  Refill requested by Pt      Requested Prescriptions     Pending Prescriptions Disp Refills    zolpidem (AMBIEN) 5 MG tablet 30 tablet 0     Sig: Take 1 tablet by mouth nightly as needed for Sleep for up to 30 doses. Date of last visit: 10/28/2021   Date of next visit: Visit date not found  Date of last refill: 5/18/21  Pharmacy Name: [facility]    [facility] Friday pm    Ok for detailed message if needed    Last Lipid Panel:    Lab Results   Component Value Date    CHOL 166 03/12/2021    TRIG 138 03/12/2021    HDL 32 03/12/2021    LDLCALC 106 03/12/2021     Last CMP: No results found for: NA, K, CL, CO2, BUN, CREATININE, GLUCOSE, CALCIUM, PROT, LABALBU, BILITOT, ALKPHOS, AST, ALT, LABGLOM, GFRAA, AGRATIO, GLOB    Last Thyroid:  No results found for: TSH, M3DBHAB, W1RAASJ, THYROIDAB, FT3, T4FREE  Last Hemoglobin A1C:    Lab Results   Component Value Date    LABA1C 5.9 08/02/2021    AVGG 123 08/02/2021       Rx verified, ordered and set to EP.

## 2022-01-06 NOTE — TELEPHONE ENCOUNTER
Prescription sent to the pharmacy as below. .    Requested Prescriptions     Signed Prescriptions Disp Refills    zolpidem (AMBIEN) 5 MG tablet 30 tablet 0     Sig: Take 1 tablet by mouth nightly as needed for Sleep for up to 30 doses. Authorizing Provider: Hartford Goltz report reviewed and no contraindications or problems noted.           Electronically signed by Eder Moreno MD on 1/6/2022 at 10:41 AM

## 2022-01-17 ENCOUNTER — TELEPHONE (OUTPATIENT)
Dept: FAMILY MEDICINE CLINIC | Age: 67
End: 2022-01-17

## 2022-01-17 NOTE — TELEPHONE ENCOUNTER
PA started on CoverMyMeds for Zolpidem 5 mg and it was denied. Documentation was faxed to the office over the weekend asking if there was an ongoing monitoring plan to identify and address drug-drug interactions between Zolpidem and Oxycodone-Acetaminophen; if the form wasn't completed and faxed back by 1/15/22 it would be denied. I don't see that the pt is taking Oxycodone-Acetaminophen so I called Yoselin Luong at [phone] and spoke to [facility] and she stated that they have record that the pt filled an Rx for Oxycodone-Acetaminophen recently. She said that if the form is completed and faxed back to them they will reopen and reconsider the denial.     Called pt and left a message for her to call back to let us know if she is taking Oxycodone-Acetaminophen. Levy Hawk has form at her desk.

## 2022-01-18 NOTE — TELEPHONE ENCOUNTER
Pt states she was prescribed Oxycodone-Acetaminophen in 11/21 when she broke her arm but is no longer taking it. Fax form updated and faxed back to [address] at [phone]. Will await response.

## 2022-01-25 ENCOUNTER — NURSE TRIAGE (OUTPATIENT)
Dept: OTHER | Facility: CLINIC | Age: 67
End: 2022-01-25

## 2022-01-25 ENCOUNTER — OFFICE VISIT (OUTPATIENT)
Dept: FAMILY MEDICINE CLINIC | Age: 67
End: 2022-01-25
Payer: MEDICARE

## 2022-01-25 VITALS
HEART RATE: 72 BPM | SYSTOLIC BLOOD PRESSURE: 128 MMHG | RESPIRATION RATE: 14 BRPM | BODY MASS INDEX: 31.65 KG/M2 | DIASTOLIC BLOOD PRESSURE: 84 MMHG | WEIGHT: 182 LBS

## 2022-01-25 DIAGNOSIS — K59.03 DRUG-INDUCED CONSTIPATION: ICD-10-CM

## 2022-01-25 DIAGNOSIS — K29.00 ACUTE GASTRITIS WITHOUT HEMORRHAGE, UNSPECIFIED GASTRITIS TYPE: ICD-10-CM

## 2022-01-25 DIAGNOSIS — R11.0 NAUSEA: ICD-10-CM

## 2022-01-25 DIAGNOSIS — R10.13 DYSPEPSIA: Primary | ICD-10-CM

## 2022-01-25 PROCEDURE — 99214 OFFICE O/P EST MOD 30 MIN: CPT | Performed by: FAMILY MEDICINE

## 2022-01-25 RX ORDER — OMEPRAZOLE 20 MG/1
20 CAPSULE, DELAYED RELEASE ORAL
Qty: 30 CAPSULE | Refills: 1 | Status: SHIPPED | OUTPATIENT
Start: 2022-01-25

## 2022-01-25 ASSESSMENT — ENCOUNTER SYMPTOMS
VOMITING: 0
DIARRHEA: 0
NAUSEA: 1
RESPIRATORY NEGATIVE: 1
BLOOD IN STOOL: 0
CONSTIPATION: 1

## 2022-01-25 NOTE — PROGRESS NOTES
2022    Zoe Parks (:  1955) is a 77 y.o. female, Established patient, here for evaluation of the following chief complaint(s):  Nausea & Vomiting (C/O nausea since 2022, no stomach pain, no vomiting. Was on perocet from 2021 to 2022 due to broken arm. )      ASSESSMENT/PLAN:    1. Dyspepsia  -     omeprazole (PRILOSEC) 20 MG delayed release capsule; Take 1 capsule by mouth every morning (before breakfast), Disp-30 capsule, R-1Normal  2. Nausea  -     omeprazole (PRILOSEC) 20 MG delayed release capsule; Take 1 capsule by mouth every morning (before breakfast), Disp-30 capsule, R-1Normal  3. Acute gastritis without hemorrhage, unspecified gastritis type  -     omeprazole (PRILOSEC) 20 MG delayed release capsule; Take 1 capsule by mouth every morning (before breakfast), Disp-30 capsule, R-1Normal  4. Drug-induced constipation      Trial of omeprazole 20 mg daily for 3 to 4 weeks. She is encouraged to take the medication first thing in the morning on an empty stomach and avoid taking other medicines for 30 to 60 minutes after. Anti-GERD diet recommended    Increase fiber, fluids, and a daily stool softener recommended for constipation. She will only use stimulant laxatives as needed. If symptoms not improving the next couple of weeks, would refer to GI for further evaluation. Follow-up if not improved. SUBJECTIVE/OBJECTIVE:    HPI    Patient here today with 3 to 4-week history of persistent nausea with epigastric fullness and discomfort. Symptoms started after she took Percocet for extended period of time after a right arm fracture. She has since stopped this medication. She states that her nausea is pretty consistent. She has tried to modify her diet somewhat but her symptoms have not improved. She denies any black or bloody stools. No weight loss. She has had some issues with constipation due to her use of the Percocet.   She has now stopped the Percocet and is only using Tylenol as needed for discomfort. She is trying to increase her fluid intake to help with her symptoms as well. She states that she is used occasional Mylanta and Zofran, which helps her symptoms temporarily. Review of Systems   Constitutional: Negative for fatigue and fever. HENT: Negative. Respiratory: Negative. Cardiovascular: Negative. Gastrointestinal: Positive for constipation and nausea. Negative for blood in stool, diarrhea and vomiting. Genitourinary: Negative. Musculoskeletal: Negative. Neurological: Negative. All other systems reviewed and are negative. Vitals:    01/25/22 1413   BP: 128/84   Pulse: 72   Resp: 14   Weight: 182 lb (82.6 kg)       Wt Readings from Last 3 Encounters:   01/25/22 182 lb (82.6 kg)   10/28/21 186 lb (84.4 kg)   05/18/21 193 lb 6.4 oz (87.7 kg)       BP Readings from Last 3 Encounters:   01/25/22 128/84   10/28/21 128/80   05/18/21 122/80       Physical Exam  Vitals reviewed. Constitutional:       General: She is not in acute distress. Appearance: She is well-developed. HENT:      Head: Normocephalic and atraumatic. Right Ear: Tympanic membrane normal.      Left Ear: Tympanic membrane normal.      Mouth/Throat:      Mouth: Mucous membranes are moist.      Pharynx: No posterior oropharyngeal erythema. Eyes:      Conjunctiva/sclera: Conjunctivae normal.   Cardiovascular:      Rate and Rhythm: Normal rate and regular rhythm. Heart sounds: No murmur heard. Pulmonary:      Breath sounds: Normal breath sounds. No wheezing. Abdominal:      General: Bowel sounds are normal. There is no distension. Palpations: Abdomen is soft. Tenderness: There is abdominal tenderness (mild, epigastric). There is no right CVA tenderness or left CVA tenderness. Musculoskeletal:      Right lower leg: No edema. Left lower leg: No edema. Lymphadenopathy:      Cervical: No cervical adenopathy.    Neurological: Mental Status: She is alert. An electronic signature was used to authenticate this note.         Electronically signed by Melodie Rey MD on 1/25/2022 at 2:46 PM

## 2022-06-27 DIAGNOSIS — R73.01 IFG (IMPAIRED FASTING GLUCOSE): Primary | ICD-10-CM

## 2022-06-27 DIAGNOSIS — E78.5 HYPERLIPIDEMIA, UNSPECIFIED HYPERLIPIDEMIA TYPE: ICD-10-CM

## 2022-06-27 DIAGNOSIS — I10 ESSENTIAL HYPERTENSION: Chronic | ICD-10-CM

## 2022-06-27 NOTE — TELEPHONE ENCOUNTER
This medication refill is regarding a fax request.  Refill requested by [facility]. Requested Prescriptions     Pending Prescriptions Disp Refills    lisinopril (PRINIVIL;ZESTRIL) 20 MG tablet 30 tablet 0     Sig: Take 1 tablet by mouth daily    pravastatin (PRAVACHOL) 40 MG tablet 30 tablet 0     Sig: Take 1 tablet by mouth daily     Date of last visit: 1/25/2022  Date of next visit: None  Date of last refill:    -Pravastatin 5/18/21 #90/3   -Lisinopril 5/19/21 #90/3    Last Lipid Panel:    Lab Results   Component Value Date    CHOL 166 03/12/2021    TRIG 138 03/12/2021    HDL 32 03/12/2021    LDLCALC 106 03/12/2021     Rx verified, ordered and set to EP.

## 2022-06-29 RX ORDER — PRAVASTATIN SODIUM 40 MG
40 TABLET ORAL DAILY
Qty: 30 TABLET | Refills: 0 | Status: SHIPPED | OUTPATIENT
Start: 2022-06-29 | End: 2022-08-01 | Stop reason: SDUPTHER

## 2022-06-29 RX ORDER — LISINOPRIL 20 MG/1
20 TABLET ORAL DAILY
Qty: 30 TABLET | Refills: 0 | Status: SHIPPED | OUTPATIENT
Start: 2022-06-29 | End: 2022-08-01 | Stop reason: SDUPTHER

## 2022-06-29 NOTE — TELEPHONE ENCOUNTER
Spoke with pt and she would like to use [facility] in [facility]. Meds pended. Pt would like lab orders mailed to her, and she will call to schedule an appt once she returns from vacation. Labs printed and put in envelope up front to be mailed to pt.

## 2022-06-29 NOTE — TELEPHONE ENCOUNTER
Rx EP'd to pharmacy. Please notify patient.       Requested Prescriptions     Signed Prescriptions Disp Refills    lisinopril (PRINIVIL;ZESTRIL) 20 MG tablet 30 tablet 0     Sig: Take 1 tablet by mouth daily     Authorizing Provider: Angy Warren    pravastatin (PRAVACHOL) 40 MG tablet 30 tablet 0     Sig: Take 1 tablet by mouth daily     Authorizing Provider: Angy Warren           Electronically signed by Cristian Encarnacion MD on 6/29/2022 at 4:44 PM

## 2022-07-18 LAB
AVERAGE GLUCOSE: NORMAL
CHOLESTEROL, TOTAL: 192 MG/DL
CHOLESTEROL/HDL RATIO: NORMAL
HBA1C MFR BLD: 5.8 %
HDLC SERPL-MCNC: 40 MG/DL (ref 35–70)
LDL CHOLESTEROL CALCULATED: 122 MG/DL (ref 0–160)
NONHDLC SERPL-MCNC: NORMAL MG/DL
TRIGL SERPL-MCNC: 150 MG/DL
VLDLC SERPL CALC-MCNC: NORMAL MG/DL

## 2022-08-01 DIAGNOSIS — E78.5 HYPERLIPIDEMIA, UNSPECIFIED HYPERLIPIDEMIA TYPE: ICD-10-CM

## 2022-08-01 DIAGNOSIS — I10 ESSENTIAL HYPERTENSION: Chronic | ICD-10-CM

## 2022-08-01 RX ORDER — PRAVASTATIN SODIUM 40 MG
40 TABLET ORAL DAILY
Qty: 90 TABLET | Refills: 3 | Status: SHIPPED | OUTPATIENT
Start: 2022-08-01

## 2022-08-01 RX ORDER — LISINOPRIL 20 MG/1
20 TABLET ORAL DAILY
Qty: 90 TABLET | Refills: 3 | Status: SHIPPED | OUTPATIENT
Start: 2022-08-01 | End: 2023-08-02

## 2022-08-01 NOTE — TELEPHONE ENCOUNTER
This medication refill is regarding a fax request.  Refill requested by  [facility] . Requested Prescriptions     Pending Prescriptions Disp Refills    lisinopril (PRINIVIL;ZESTRIL) 20 MG tablet 90 tablet 3     Sig: Take 1 tablet by mouth in the morning. pravastatin (PRAVACHOL) 40 MG tablet 90 tablet 3     Sig: Take 1 tablet by mouth in the morning. Date of last visit: 1/25/2022   Date of next visit: 8/16/2022  Date of last refill: 6/29/22 #30/0 for both    Last Lipid Panel:    Lab Results   Component Value Date/Time    CHOL 192 07/18/2022 12:00 AM    TRIG 150 07/18/2022 12:00 AM    HDL 40 07/18/2022 12:00 AM    LDLCALC 122 07/18/2022 12:00 AM     Last CMP completed 7/18/22 with Lipid panel and A1C    Rx verified, ordered and set to EP.

## 2022-08-09 ENCOUNTER — OFFICE VISIT (OUTPATIENT)
Dept: FAMILY MEDICINE CLINIC | Age: 67
End: 2022-08-09
Payer: MEDICARE

## 2022-08-09 VITALS
BODY MASS INDEX: 31.41 KG/M2 | SYSTOLIC BLOOD PRESSURE: 122 MMHG | HEART RATE: 64 BPM | DIASTOLIC BLOOD PRESSURE: 68 MMHG | WEIGHT: 184 LBS | TEMPERATURE: 97.7 F | HEIGHT: 64 IN | RESPIRATION RATE: 12 BRPM

## 2022-08-09 DIAGNOSIS — F51.01 PRIMARY INSOMNIA: ICD-10-CM

## 2022-08-09 DIAGNOSIS — Z00.00 INITIAL MEDICARE ANNUAL WELLNESS VISIT: Primary | ICD-10-CM

## 2022-08-09 DIAGNOSIS — Z12.31 ENCOUNTER FOR SCREENING MAMMOGRAM FOR MALIGNANT NEOPLASM OF BREAST: ICD-10-CM

## 2022-08-09 DIAGNOSIS — H61.21 IMPACTED CERUMEN OF RIGHT EAR: ICD-10-CM

## 2022-08-09 DIAGNOSIS — R79.89 ABNORMAL LFTS: ICD-10-CM

## 2022-08-09 PROCEDURE — 1123F ACP DISCUSS/DSCN MKR DOCD: CPT | Performed by: FAMILY MEDICINE

## 2022-08-09 PROCEDURE — G0438 PPPS, INITIAL VISIT: HCPCS | Performed by: FAMILY MEDICINE

## 2022-08-09 RX ORDER — ZOLPIDEM TARTRATE 5 MG/1
5 TABLET ORAL NIGHTLY PRN
Qty: 30 TABLET | Refills: 0 | Status: SHIPPED | OUTPATIENT
Start: 2022-08-09 | End: 2023-02-11

## 2022-08-09 SDOH — ECONOMIC STABILITY: FOOD INSECURITY: WITHIN THE PAST 12 MONTHS, THE FOOD YOU BOUGHT JUST DIDN'T LAST AND YOU DIDN'T HAVE MONEY TO GET MORE.: NEVER TRUE

## 2022-08-09 SDOH — ECONOMIC STABILITY: FOOD INSECURITY: WITHIN THE PAST 12 MONTHS, YOU WORRIED THAT YOUR FOOD WOULD RUN OUT BEFORE YOU GOT MONEY TO BUY MORE.: NEVER TRUE

## 2022-08-09 ASSESSMENT — PATIENT HEALTH QUESTIONNAIRE - PHQ9
SUM OF ALL RESPONSES TO PHQ9 QUESTIONS 1 & 2: 0
SUM OF ALL RESPONSES TO PHQ QUESTIONS 1-9: 0
1. LITTLE INTEREST OR PLEASURE IN DOING THINGS: 0
2. FEELING DOWN, DEPRESSED OR HOPELESS: 0

## 2022-08-09 ASSESSMENT — SOCIAL DETERMINANTS OF HEALTH (SDOH): HOW HARD IS IT FOR YOU TO PAY FOR THE VERY BASICS LIKE FOOD, HOUSING, MEDICAL CARE, AND HEATING?: NOT HARD AT ALL

## 2022-08-09 ASSESSMENT — LIFESTYLE VARIABLES
HOW MANY STANDARD DRINKS CONTAINING ALCOHOL DO YOU HAVE ON A TYPICAL DAY: 1 OR 2
HOW OFTEN DO YOU HAVE A DRINK CONTAINING ALCOHOL: MONTHLY OR LESS

## 2022-08-09 NOTE — PROGRESS NOTES
Medicare Annual Wellness Visit    Primitivo Ortiz is here for Medicare AWV (Pt doing well), Discuss Labs, and Ear Fullness (Right ear fullness, )    Assessment & Plan     1. Initial Medicare annual wellness visit  2. Encounter for screening mammogram for malignant neoplasm of breast  -     [facility] DIGITAL SCREEN BILATERAL; Future  3. Primary insomnia  -     zolpidem (AMBIEN) 5 MG tablet; Take 1 tablet by mouth nightly as needed for Sleep for up to 30 doses. , Disp-30 tablet, R-0Print  4. Abnormal LFTs  -     AST; Future  -     ALT; Future  5. Impacted cerumen of right ear    Continue current medications    OARRS report reviewed and no contraindications or problems noted. Low carb, low sugar diet and weight loss suggested to reduce LFTs. Recheck LFTs in 6 weeks. If not improved, refer to GI    Continue yearly mammograms    Right ear irrigation today    She is encouraged to schedule follow up colonoscopy    She declines Prevnar 20 today    Health care decision maker documented today     Recommendations for Preventive Services Due: see orders and patient instructions/AVS.    Recommended screening schedule for the next 5-10 years is provided to the patient in written form: see Patient Instructions/AVS.     Follow up yearly and prn       Subjective     Patient c/o some right ear fullness. She has a h/o wax buildup in the past.  No earache. She has healed from her shoulder injury. She still struggles with sleep and occasionally uses Ambien. She requests a refill. She admits that she's not watching her diet closely and her LFTs are up. Takes all meds as directed and denies side effects. No recent illnesses or hospitalizations. Nonsmoker. Body mass index is 31.58 kg/m². Patient's complete Health Risk Assessment and screening values have been reviewed and are found in Flowsheets.  The following problems were reviewed today and where indicated follow up appointments were made and/or referrals ordered. Positive Risk Factor Screenings with Interventions:    Fall Risk:  Do you feel unsteady or are you worried about falling? : (!) yes  2 or more falls in past year?: no  Fall with injury in past year?: (!) yes   Fall Risk Interventions:    Home safety tips provided            General Health and ACP:  General  In general, how would you say your health is?: Good  In the past 7 days, have you experienced any of the following: New or Increased Pain, New or Increased Fatigue, Loneliness, Social Isolation, Stress or Anger?: No  Do you get the social and emotional support that you need?: Yes  Do you have a Living Will?: Yes    Advance Directives       Power of  Living Will ACP-Advance Directive ACP-Power of     Not on File Not on File Not on File Not on File        General Health Risk Interventions:  No intervention needed    Health Habits/Nutrition:  Physical Activity: Insufficiently Active    Days of Exercise per Week: 1 day    Minutes of Exercise per Session: 20 min     Have you lost any weight without trying in the past 3 months?: No  Body mass index: (!) 31.58  Have you seen the dentist within the past year?: Yes  Health Habits/Nutrition Interventions:  Inadequate physical activity:  patient agrees to increase physical activity as follows: increase walking/biking  Nutritional issues:  patient encouraged to follow a low carb diet    Hearing/Vision:  Do you or your family notice any trouble with your hearing that hasn't been managed with hearing aids?: (!) Yes  Do you have difficulty driving, watching TV, or doing any of your daily activities because of your eyesight?: (!) Yes  Have you had an eye exam within the past year?: Yes  No results found.   Hearing/Vision Interventions:  Hearing concerns:  patient declines any further evaluation/treatment for hearing issues  Vision concerns:  patient encouraged to make appointment with his/her eye specialist    Safety:  Do you have working smoke detectors?: Yes  Do you have any tripping hazards - loose or unsecured carpets or rugs?: (!) Yes  Do you have any tripping hazards - clutter in doorways, halls, or stairs?: No  Do you have either shower bars, grab bars, non-slip mats or non-slip surfaces in your shower or bathtub?: Yes  Do all of your stairways have a railing or banister?: Yes  Do you always fasten your seatbelt when you are in a car?: Yes  Safety Interventions:  Home safety tips provided           Objective   Vitals:    08/09/22 1126   BP: 122/68   Pulse: 64   Resp: 12   Temp: 97.7 °F (36.5 °C)   TempSrc: Oral   Weight: 184 lb (83.5 kg)   Height: 5' 4\" (1.626 m)      Body mass index is 31.58 kg/m². General Appearance: alert and oriented to person, place and time, well developed and well- nourished, in no acute distress  Skin: warm and dry, no rash or erythema  Head: normocephalic and atraumatic  Eyes: pupils equal, round, and reactive to light, extraocular eye movements intact, conjunctivae normal  ENT: Right ear canal with cerumen impaction  Neck: supple and non-tender without mass, no thyromegaly or thyroid nodules, no cervical lymphadenopathy  Pulmonary/Chest: clear to auscultation bilaterally- no wheezes, rales or rhonchi, normal air movement, no respiratory distress  Cardiovascular: normal rate, regular rhythm, normal S1 and S2, no murmurs, rubs, clicks, or gallops, distal pulses intact, no carotid bruits  Abdomen: soft, non-tender, non-distended, normal bowel sounds, no masses or organomegaly  Extremities: no cyanosis, clubbing or edema  Musculoskeletal: normal range of motion, no joint swelling, deformity or tenderness                   Allergies   Allergen Reactions    Pcn [Penicillins] Rash     Prior to Visit Medications    Medication Sig Taking? Authorizing Provider   zolpidem (AMBIEN) 5 MG tablet Take 1 tablet by mouth nightly as needed for Sleep for up to 30 doses.  Yes Cynthia Juarez MD   lisinopril (PRINIVIL;ZESTRIL) 20 MG tablet Take 1 tablet by mouth in the morning. Yes Branden Watts MD   pravastatin (PRAVACHOL) 40 MG tablet Take 1 tablet by mouth in the morning.  Yes Branden Watts MD   aspirin 81 MG tablet Take 81 mg by mouth daily Yes Historical Provider, MD   omeprazole (PRILOSEC) 20 MG delayed release capsule Take 1 capsule by mouth every morning (before breakfast)  Patient not taking: Reported on 8/9/2022  Branden Watts MD       [facility] (Including outside providers/suppliers regularly involved in providing care):   Patient Care Team:  Branden Watts MD as PCP - General (Family Medicine)  Branden Watts MD as PCP - [facility] Empaneled Provider     Reviewed and updated this visit:  Tobacco  Allergies  Meds  Problems  Med Hx  Surg Hx  Soc Hx  Fam Hx                   Electronically signed by Branden Watts MD on 8/9/2022 at 12:25 PM

## 2022-08-09 NOTE — PATIENT INSTRUCTIONS
Personalized Preventive Plan for Meggan Owen - 8/9/2022  Medicare offers a range of preventive health benefits. Some of the tests and screenings are paid in full while other may be subject to a deductible, co-insurance, and/or copay. Some of these benefits include a comprehensive review of your medical history including lifestyle, illnesses that may run in your family, and various assessments and screenings as appropriate. After reviewing your medical record and screening and assessments performed today your provider may have ordered immunizations, labs, imaging, and/or referrals for you. A list of these orders (if applicable) as well as your Preventive Care list are included within your After Visit Summary for your review. Other Preventive Recommendations:    A preventive eye exam performed by an eye specialist is recommended every 1-2 years to screen for glaucoma; cataracts, macular degeneration, and other eye disorders. A preventive dental visit is recommended every 6 months. Try to get at least 150 minutes of exercise per week or 10,000 steps per day on a pedometer . Order or download the FREE \"Exercise & Physical Activity: Your Everyday Guide\" from [employer]. Call 6-516.648.2570 or search [employer] online. You need 3038-4251 mg of calcium and 7227-7321 IU of vitamin D per day. It is possible to meet your calcium requirement with diet alone, but a vitamin D supplement is usually necessary to meet this goal.  When exposed to the sun, use a sunscreen that protects against both UVA and UVB radiation with an SPF of 30 or greater. Reapply every 2 to 3 hours or after sweating, drying off with a towel, or swimming. Always wear a seat belt when traveling in a car. Always wear a helmet when riding a bicycle or motorcycle.

## 2022-09-27 ENCOUNTER — OFFICE VISIT (OUTPATIENT)
Dept: FAMILY MEDICINE CLINIC | Age: 67
End: 2022-09-27
Payer: MEDICARE

## 2022-09-27 VITALS
WEIGHT: 187 LBS | HEART RATE: 88 BPM | BODY MASS INDEX: 32.1 KG/M2 | SYSTOLIC BLOOD PRESSURE: 134 MMHG | DIASTOLIC BLOOD PRESSURE: 76 MMHG | RESPIRATION RATE: 16 BRPM

## 2022-09-27 DIAGNOSIS — H57.89 REDNESS OF EYE, LEFT: ICD-10-CM

## 2022-09-27 DIAGNOSIS — R60.0 FACIAL EDEMA: Primary | ICD-10-CM

## 2022-09-27 PROCEDURE — 99213 OFFICE O/P EST LOW 20 MIN: CPT | Performed by: NURSE PRACTITIONER

## 2022-09-27 PROCEDURE — 1123F ACP DISCUSS/DSCN MKR DOCD: CPT | Performed by: NURSE PRACTITIONER

## 2022-09-27 RX ORDER — DOXYCYCLINE HYCLATE 100 MG
100 TABLET ORAL 2 TIMES DAILY
Qty: 20 TABLET | Refills: 0 | Status: SHIPPED | OUTPATIENT
Start: 2022-09-27 | End: 2022-10-07

## 2022-09-27 ASSESSMENT — ENCOUNTER SYMPTOMS
SHORTNESS OF BREATH: 0
EYE REDNESS: 1
CONSTIPATION: 0
NAUSEA: 0
BLOOD IN STOOL: 0
DIARRHEA: 0
VOMITING: 0

## 2022-09-27 NOTE — PROGRESS NOTES
Chief Complaint   Patient presents with    Eye Problem     C/O left eye puffy x one week, painful to touch. Unsure of the cause. SUBJECTIVE     Manuelito Weaver is a 79 y. o.female      Pt complains of soreness and puffiness below the left eye for the last week. Eye itself is not red, but under the eyelid it is. Tylenol does not help much. Went on vacation recently and had some eye itchiness during that then but the puffy eye started after. No vision changes. Will see a new eye doctor next Friday. Does have a headache at base of skull but this is not new and sees chiropractor. Review of Systems   Constitutional:  Negative for chills, diaphoresis and fever. Eyes:  Positive for redness (left). Mild edema under left eye along with mild discomfort of the eye   Respiratory:  Negative for shortness of breath. Cardiovascular:  Negative for chest pain, palpitations and leg swelling. Gastrointestinal:  Negative for blood in stool, constipation, diarrhea, nausea and vomiting. Genitourinary:  Negative for dysuria and hematuria. Musculoskeletal:  Negative for myalgias. Neurological:  Negative for dizziness and headaches. All other systems reviewed and are negative. OBJECTIVE     /76   Pulse 88   Resp 16   Wt 187 lb (84.8 kg)   BMI 32.10 kg/m²     Physical Exam  Vitals and nursing note reviewed. Constitutional:       Appearance: She is well-developed. HENT:      Head: Normocephalic and atraumatic. Right Ear: External ear normal.      Left Ear: External ear normal.      Nose: Nose normal.   Eyes:      Conjunctiva/sclera: Conjunctivae normal.      Pupils: Pupils are equal, round, and reactive to light. Comments: Mild erythema under bottom eye lid. Mild edema under eye to upper cheekbone area   Cardiovascular:      Rate and Rhythm: Normal rate and regular rhythm. Heart sounds: Normal heart sounds.    Pulmonary:      Effort: Pulmonary effort is normal.      Breath

## 2022-10-03 ENCOUNTER — HOSPITAL ENCOUNTER (OUTPATIENT)
Age: 67
End: 2022-10-03
Payer: MEDICARE

## 2022-10-03 ENCOUNTER — HOSPITAL ENCOUNTER (OUTPATIENT)
Dept: MAMMOGRAPHY | Age: 67
Discharge: HOME | End: 2022-10-03
Payer: MEDICARE

## 2022-10-03 DIAGNOSIS — Z12.31 ENCOUNTER FOR SCREENING MAMMOGRAM FOR MALIGNANT NEOPLASM OF BREAST: ICD-10-CM

## 2022-10-03 PROCEDURE — 77063 BREAST TOMOSYNTHESIS BI: CPT

## 2023-01-05 ENCOUNTER — TELEPHONE (OUTPATIENT)
Dept: FAMILY MEDICINE CLINIC | Age: 68
End: 2023-01-05

## 2023-01-05 NOTE — TELEPHONE ENCOUNTER
COPIED FROM   The report from a recent ED visit is reviewed and shows a possible right ovarian mass. Please make sure that she's aware of this and has follow up.    MALLY

## 2023-01-09 NOTE — TELEPHONE ENCOUNTER
Spoke with patient. She states that she is scheduled to have an ultrasound done this Thursday at [facility] but was not referred to GYN or any other specialist. Please advise.

## 2023-01-09 NOTE — TELEPHONE ENCOUNTER
Await ultrasound result. Please make sure that we get a copy of it. She may need GYN evaluation depending on what the US shows.  MALLY

## 2023-01-12 NOTE — TELEPHONE ENCOUNTER
Patient calls today to see if report was sent to our office--she completed the testing this morning. I called medical records at [address] [place] [address] report is still currently in process. I will call to check on this again tomorrow. Detailed message left for patient letting her know this.

## 2023-01-13 ENCOUNTER — TELEPHONE (OUTPATIENT)
Dept: FAMILY MEDICINE CLINIC | Age: 68
End: 2023-01-13

## 2023-01-13 DIAGNOSIS — N83.291 COMPLEX CYST OF RIGHT OVARY: Primary | ICD-10-CM

## 2023-01-13 RX ORDER — TRAMADOL HYDROCHLORIDE 50 MG/1
50 TABLET ORAL EVERY 8 HOURS PRN
Qty: 15 TABLET | Refills: 0 | Status: SHIPPED | OUTPATIENT
Start: 2023-01-13 | End: 2023-01-16 | Stop reason: ALTCHOICE

## 2023-01-13 NOTE — TELEPHONE ENCOUNTER
Prescription sent to the pharmacy as below. Please notify the patient. Requested Prescriptions     Signed Prescriptions Disp Refills    traMADol (ULTRAM) 50 MG tablet 15 tablet 0     Sig: Take 1 tablet by mouth every 8 hours as needed for Pain for up to 5 days. Take lowest dose possible to manage pain Max Daily Amount: 150 mg     Authorizing Provider: Ivy Gagnon report reviewed and no contraindications or problems noted.           Electronically signed by Balbina Tucker MD on 1/13/2023 at 3:13 PM

## 2023-01-13 NOTE — PROGRESS NOTES
Pelvic US shows a complex cystic area near the right ovary. She should see a GYN for evaluation of this. Please arrange this for her if not already done.  MALLY

## 2023-01-13 NOTE — TELEPHONE ENCOUNTER
Followed up with patient as GYN did not call back prior to the office closing. Patient states that since speaking to our office this morning, her pain has progressively gotten worse. She has tried naproxen and tylenol with no benefit. Pain is now in the right hip/right buttock/right leg. Appointment was scheduled with TS on Monday but patient would like to know if there is anything more you would be willing to prescribe to get her through until that time. [facility]acy is [facility] in [place]. Patient instructed to go back to ED this weekend if pain worsens significantly--verbalized understanding. Please route message back to clinical pool so that appointment date for GYN referral can be documented once their office calls back.

## 2023-01-13 NOTE — TELEPHONE ENCOUNTER
Patient recently completed US at [facility]. CG reviewed and her review is as follows:    Progress Notes  Montana Liu MD (Physician)   Family Medicine     Pelvic US shows a complex cystic area near the right ovary. She should see a GYN for evaluation of this. Please arrange this for her if not already done. CG        Patient would like our office to refer. Contacted [facility] [place] J[facility]US[facility]  [facility]RETT. They are requesting that records be faxed. They will have Dr. Linn Lowry review today. Their office will contact the patient and our office to let us know the status. Records faxed to [phone]. Await callback. Patient notified.

## 2023-01-15 ENCOUNTER — HOSPITAL ENCOUNTER (EMERGENCY)
Age: 68
Discharge: HOME | End: 2023-01-15
Attending: EMERGENCY MEDICINE
Payer: MEDICARE

## 2023-01-15 ENCOUNTER — APPOINTMENT (OUTPATIENT)
Dept: CT IMAGING | Age: 68
End: 2023-01-15
Payer: MEDICARE

## 2023-01-15 VITALS
TEMPERATURE: 99 F | DIASTOLIC BLOOD PRESSURE: 62 MMHG | HEIGHT: 64 IN | SYSTOLIC BLOOD PRESSURE: 112 MMHG | WEIGHT: 190 LBS | RESPIRATION RATE: 16 BRPM | BODY MASS INDEX: 32.44 KG/M2 | HEART RATE: 70 BPM | OXYGEN SATURATION: 94 %

## 2023-01-15 DIAGNOSIS — N30.00 ACUTE CYSTITIS WITHOUT HEMATURIA: Primary | ICD-10-CM

## 2023-01-15 DIAGNOSIS — N13.4 HYDROURETER ON RIGHT: ICD-10-CM

## 2023-01-15 DIAGNOSIS — R19.00 PELVIC MASS: ICD-10-CM

## 2023-01-15 DIAGNOSIS — R10.9 ABDOMINAL PAIN, UNSPECIFIED ABDOMINAL LOCATION: ICD-10-CM

## 2023-01-15 DIAGNOSIS — N28.89 URETERAL MASS: ICD-10-CM

## 2023-01-15 LAB
ALBUMIN SERPL-MCNC: 3.3 G/DL (ref 3.5–5.1)
ALP BLD-CCNC: 70 U/L (ref 38–126)
ALT SERPL-CCNC: 10 U/L (ref 11–66)
ANION GAP SERPL CALCULATED.3IONS-SCNC: 12 MEQ/L (ref 8–16)
AST SERPL-CCNC: 13 U/L (ref 5–40)
BACTERIA: ABNORMAL
BASOPHILS # BLD: 0.4 %
BASOPHILS ABSOLUTE: 0 THOU/MM3 (ref 0–0.1)
BILIRUB SERPL-MCNC: 0.5 MG/DL (ref 0.3–1.2)
BILIRUBIN URINE: NEGATIVE
BLOOD, URINE: ABNORMAL
BUN BLDV-MCNC: 20 MG/DL (ref 7–22)
CALCIUM SERPL-MCNC: 9 MG/DL (ref 8.5–10.5)
CASTS: ABNORMAL /LPF
CASTS: ABNORMAL /LPF
CHARACTER, URINE: CLEAR
CHLORIDE BLD-SCNC: 95 MEQ/L (ref 98–111)
CO2: 23 MEQ/L (ref 23–33)
COLOR: YELLOW
CREAT SERPL-MCNC: 1 MG/DL (ref 0.4–1.2)
CRYSTALS: ABNORMAL
EOSINOPHIL # BLD: 0.6 %
EOSINOPHILS ABSOLUTE: 0.1 THOU/MM3 (ref 0–0.4)
EPITHELIAL CELLS, UA: ABNORMAL /HPF
ERYTHROCYTE [DISTWIDTH] IN BLOOD BY AUTOMATED COUNT: 11.7 % (ref 11.5–14.5)
ERYTHROCYTE [DISTWIDTH] IN BLOOD BY AUTOMATED COUNT: 40.2 FL (ref 35–45)
GFR SERPL CREATININE-BSD FRML MDRD: > 60 ML/MIN/1.73M2
GLUCOSE BLD-MCNC: 117 MG/DL (ref 70–108)
GLUCOSE, URINE: NEGATIVE MG/DL
HCT VFR BLD CALC: 42.4 % (ref 37–47)
HEMOGLOBIN: 14.3 GM/DL (ref 12–16)
IMMATURE GRANS (ABS): 0.04 THOU/MM3 (ref 0–0.07)
IMMATURE GRANULOCYTES: 0.4 %
KETONES, URINE: NEGATIVE
LEUKOCYTE EST, POC: ABNORMAL
LIPASE: 22.6 U/L (ref 5.6–51.3)
LYMPHOCYTES # BLD: 16 %
LYMPHOCYTES ABSOLUTE: 1.8 THOU/MM3 (ref 1–4.8)
MCH RBC QN AUTO: 31.7 PG (ref 26–33)
MCHC RBC AUTO-ENTMCNC: 33.7 GM/DL (ref 32.2–35.5)
MCV RBC AUTO: 94 FL (ref 81–99)
MISCELLANEOUS LAB TEST RESULT: ABNORMAL
MONOCYTES # BLD: 9.3 %
MONOCYTES ABSOLUTE: 1.1 THOU/MM3 (ref 0.4–1.3)
NITRITE, URINE: NEGATIVE
NUCLEATED RED BLOOD CELLS: 0 /100 WBC
OSMOLALITY CALCULATION: 264.4 MOSMOL/KG (ref 275–300)
PH UA: 6.5 (ref 5–9)
PLATELET # BLD: 309 THOU/MM3 (ref 130–400)
PMV BLD AUTO: 10 FL (ref 9.4–12.4)
POTASSIUM SERPL-SCNC: 4.6 MEQ/L (ref 3.5–5.2)
PROTEIN UA: ABNORMAL MG/DL
RBC # BLD: 4.51 MILL/MM3 (ref 4.2–5.4)
RBC URINE: ABNORMAL /HPF
RENAL EPITHELIAL, UA: ABNORMAL
SEG NEUTROPHILS: 73.3 %
SEGMENTED NEUTROPHILS ABSOLUTE COUNT: 8.3 THOU/MM3 (ref 1.8–7.7)
SODIUM BLD-SCNC: 130 MEQ/L (ref 135–145)
SPECIFIC GRAVITY UA: 1.02 (ref 1–1.03)
TOTAL PROTEIN: 6.9 G/DL (ref 6.1–8)
TROPONIN T: < 0.01 NG/ML
UROBILINOGEN, URINE: 0.2 EU/DL (ref 0–1)
WBC # BLD: 11.3 THOU/MM3 (ref 4.8–10.8)
WBC UA: ABNORMAL /HPF
YEAST: ABNORMAL

## 2023-01-15 PROCEDURE — 83690 ASSAY OF LIPASE: CPT

## 2023-01-15 PROCEDURE — 76376 3D RENDER W/INTRP POSTPROCES: CPT

## 2023-01-15 PROCEDURE — 84484 ASSAY OF TROPONIN QUANT: CPT

## 2023-01-15 PROCEDURE — 93005 ELECTROCARDIOGRAM TRACING: CPT | Performed by: EMERGENCY MEDICINE

## 2023-01-15 PROCEDURE — 2580000003 HC RX 258: Performed by: EMERGENCY MEDICINE

## 2023-01-15 PROCEDURE — 96365 THER/PROPH/DIAG IV INF INIT: CPT

## 2023-01-15 PROCEDURE — 6360000004 HC RX CONTRAST MEDICATION: Performed by: EMERGENCY MEDICINE

## 2023-01-15 PROCEDURE — 85025 COMPLETE CBC W/AUTO DIFF WBC: CPT

## 2023-01-15 PROCEDURE — 80053 COMPREHEN METABOLIC PANEL: CPT

## 2023-01-15 PROCEDURE — 6370000000 HC RX 637 (ALT 250 FOR IP): Performed by: EMERGENCY MEDICINE

## 2023-01-15 PROCEDURE — 74177 CT ABD & PELVIS W/CONTRAST: CPT

## 2023-01-15 PROCEDURE — 96375 TX/PRO/DX INJ NEW DRUG ADDON: CPT

## 2023-01-15 PROCEDURE — 6360000002 HC RX W HCPCS: Performed by: EMERGENCY MEDICINE

## 2023-01-15 PROCEDURE — 36415 COLL VENOUS BLD VENIPUNCTURE: CPT

## 2023-01-15 PROCEDURE — 99285 EMERGENCY DEPT VISIT HI MDM: CPT

## 2023-01-15 PROCEDURE — 93010 ELECTROCARDIOGRAM REPORT: CPT | Performed by: NUCLEAR MEDICINE

## 2023-01-15 PROCEDURE — 81001 URINALYSIS AUTO W/SCOPE: CPT

## 2023-01-15 RX ORDER — HYDROCODONE BITARTRATE AND ACETAMINOPHEN 5; 325 MG/1; MG/1
1 TABLET ORAL ONCE
Status: COMPLETED | OUTPATIENT
Start: 2023-01-15 | End: 2023-01-15

## 2023-01-15 RX ORDER — CEFDINIR 300 MG/1
300 CAPSULE ORAL 2 TIMES DAILY
Qty: 14 CAPSULE | Refills: 0 | Status: SHIPPED | OUTPATIENT
Start: 2023-01-15 | End: 2023-01-22

## 2023-01-15 RX ORDER — ONDANSETRON 2 MG/ML
4 INJECTION INTRAMUSCULAR; INTRAVENOUS ONCE
Status: COMPLETED | OUTPATIENT
Start: 2023-01-15 | End: 2023-01-15

## 2023-01-15 RX ORDER — NALOXONE HYDROCHLORIDE 4 MG/.1ML
1 SPRAY NASAL PRN
Qty: 1 EACH | Refills: 0 | Status: SHIPPED | OUTPATIENT
Start: 2023-01-15

## 2023-01-15 RX ORDER — 0.9 % SODIUM CHLORIDE 0.9 %
1000 INTRAVENOUS SOLUTION INTRAVENOUS ONCE
Status: COMPLETED | OUTPATIENT
Start: 2023-01-15 | End: 2023-01-15

## 2023-01-15 RX ORDER — KETOROLAC TROMETHAMINE 30 MG/ML
30 INJECTION, SOLUTION INTRAMUSCULAR; INTRAVENOUS ONCE
Status: COMPLETED | OUTPATIENT
Start: 2023-01-15 | End: 2023-01-15

## 2023-01-15 RX ORDER — HYDROCODONE BITARTRATE AND ACETAMINOPHEN 5; 325 MG/1; MG/1
1 TABLET ORAL EVERY 6 HOURS PRN
Qty: 12 TABLET | Refills: 0 | Status: SHIPPED | OUTPATIENT
Start: 2023-01-15 | End: 2023-01-18

## 2023-01-15 RX ORDER — ACETAMINOPHEN 500 MG
1000 TABLET ORAL EVERY 6 HOURS PRN
COMMUNITY

## 2023-01-15 RX ADMIN — SODIUM CHLORIDE 1000 ML: 9 INJECTION, SOLUTION INTRAVENOUS at 09:35

## 2023-01-15 RX ADMIN — KETOROLAC TROMETHAMINE 30 MG: 30 INJECTION, SOLUTION INTRAMUSCULAR; INTRAVENOUS at 09:37

## 2023-01-15 RX ADMIN — ONDANSETRON 4 MG: 2 INJECTION INTRAMUSCULAR; INTRAVENOUS at 09:37

## 2023-01-15 RX ADMIN — IOPAMIDOL 80 ML: 755 INJECTION, SOLUTION INTRAVENOUS at 09:19

## 2023-01-15 RX ADMIN — HYDROCODONE BITARTRATE AND ACETAMINOPHEN 1 TABLET: 5; 325 TABLET ORAL at 09:36

## 2023-01-15 RX ADMIN — CEFTRIAXONE SODIUM 1000 MG: 1 INJECTION, POWDER, FOR SOLUTION INTRAMUSCULAR; INTRAVENOUS at 10:28

## 2023-01-15 ASSESSMENT — PAIN DESCRIPTION - LOCATION: LOCATION: ABDOMEN

## 2023-01-15 ASSESSMENT — ENCOUNTER SYMPTOMS
SHORTNESS OF BREATH: 0
COUGH: 0
VOMITING: 0
ABDOMINAL PAIN: 1
NAUSEA: 0

## 2023-01-15 ASSESSMENT — PAIN SCALES - GENERAL
PAINLEVEL_OUTOF10: 0
PAINLEVEL_OUTOF10: 7

## 2023-01-15 NOTE — DISCHARGE INSTRUCTIONS
Be sure to follow-up tomorrow as scheduled with your primary care provider. Be sure to call urology tomorrow to schedule follow-up in the next 7 days. Return to the ED if you have any new or changing symptoms such as abdominal pain, decreased urine output, fevers, unable to tolerate oral medicine, vomiting, lightheadedness, difficulty ambulating, or you have any other concerns.

## 2023-01-15 NOTE — ED NOTES
Dr Yvonne Kramer at bedside to go over CT results w/pt and family.       Shon Mckinnon RN  01/15/23 1206

## 2023-01-15 NOTE — ED TRIAGE NOTES
Pt ambulatory to rm 04 per intake c/o persistent RLQ ABD pain/pressure, low back pain, nausea for three weeks. Pt states she has been seen at [facility], had CT scans and US but pain is not resolving. Has an appt w/PCP tomorrow to go over US results. Pt reports nausea, no emesis, fever/chills- pain worse w/movement and ambulation. Appears in no acute distress, VSS.

## 2023-01-15 NOTE — ED NOTES
Pt medicated per MAR. Continues restful on cart, VSS. Waiting results.       Jac Torrez RN  01/15/23 7768

## 2023-01-16 ENCOUNTER — TELEPHONE (OUTPATIENT)
Dept: UROLOGY | Age: 68
End: 2023-01-16

## 2023-01-16 LAB
EKG ATRIAL RATE: 80 BPM
EKG P AXIS: 39 DEGREES
EKG P-R INTERVAL: 144 MS
EKG Q-T INTERVAL: 332 MS
EKG QRS DURATION: 72 MS
EKG QTC CALCULATION (BAZETT): 382 MS
EKG R AXIS: 23 DEGREES
EKG T AXIS: 7 DEGREES
EKG VENTRICULAR RATE: 80 BPM

## 2023-01-16 NOTE — TELEPHONE ENCOUNTER
[facility]) ED Follow up Call    Reason for ED visit:  Abdominal pain     Did you receive discharge instructions? Yes  Do you understand the discharge instructions? Yes  Did the ED give you any new prescriptions? Yes  Were you able to fill your prescriptions? Yes    Do you have one of our red, yellow and green  Zone sheets that help you to determine when you should go to the ED? Not Applicable    Do you need or want to make a follow up appt with your PCP? No. Will f/up with urology and GYN    Do you have any further needs in the home i.e. Equipment? No    Spoke to pt regarding the appt with TS that was previously scheduled for today at 2:00 PM; pt canceled this appt as she is going to f/up with urology and GYN. Pt states she was written an Rx for Norco which she got filled so will not be using the Rx for Ultram that was written by CG on 1/13/23. [address] in [place] and spoke to [place] and canceled the Rx for Ultram.     Spoke to Hina Moss at [facility] and notified her that the pt was seen in the ED yesterday and had a CT abd/pelvis done. She will pull it up and let Dr. Dennys Daniel review it and call the pt with an appointment. Pt notified they will be calling her.

## 2023-01-16 NOTE — TELEPHONE ENCOUNTER
Patient was in ER this weekend please rev records and let me know when to put her in and with whom. Please advise, thank you.

## 2023-01-16 NOTE — TELEPHONE ENCOUNTER
I can see her tomorrow and discuss her case with Dr. Tony Barker, as he will be in the clinic as well

## 2023-01-16 NOTE — ED PROVIDER NOTES
[facility] ENCOUNTER          Pt Name: Steph Caal  MRN: [id number]  [place] 1955  Date of evaluation: 1/15/2023  Emergency Physician: Tony Mercado, [facility]       Chief Complaint   Patient presents with    Abdominal Pain     Ongoing ABD pain since [place]. Seen at [facility] for same     History obtained from the patient and her . HISTORY OF PRESENT ILLNESS    HPI  Steph Caal is a 79 y.o. female who presents to the emergency department for evaluation of abdominal pain. Pain is dull and achy. Has been ongoing for 3 weeks to 1 month. Pain is rated at a 6 out of 10. Pain is worse with rotation and bending. Pain wraps from right mid flank to right lower quadrant. Seen at [address] [place] [address]. CT with Ovarian lesion. US questionable complex cyst. Referred to GYN and PCP. She reports then over the last 3 days she has been having additional suprapubic discomfort, chills, and dysuria. The patient has no other acute complaints at this time. REVIEW OF SYSTEMS   Review of Systems   Constitutional:  Positive for chills. Negative for activity change, appetite change, fever and unexpected weight change. Respiratory:  Negative for cough and shortness of breath. Gastrointestinal:  Positive for abdominal pain. Negative for nausea and vomiting. Genitourinary:  Positive for dysuria. Negative for decreased urine volume and urgency. Skin:  Negative for rash. PAST MEDICAL AND SURGICAL HISTORY     Past Medical History:   Diagnosis Date    Herpes zoster 11/12    Hyperlipidemia     Hypertension      Past Surgical History:   Procedure Laterality Date    DILATION AND CURETTAGE OF UTERUS      TOOTH EXTRACTION           MEDICATIONS   No current facility-administered medications for this encounter.     Current Outpatient Medications:     acetaminophen (TYLENOL) 500 MG tablet, Take 1,000 mg by mouth every 6 hours as needed for Pain, Disp: , Rfl:     cefdinir (OMNICEF) 300 MG capsule, Take 1 capsule by mouth 2 times daily for 7 days, Disp: 14 capsule, Rfl: 0    HYDROcodone-acetaminophen (NORCO) 5-325 MG per tablet, Take 1 tablet by mouth every 6 hours as needed for Pain for up to 3 days. Intended supply: 3 days. Take lowest dose possible to manage pain Max Daily Amount: 4 tablets, Disp: 12 tablet, Rfl: 0    naloxone 4 MG/0.1ML LIQD nasal spray, 1 spray by Nasal route as needed for Opioid Reversal, Disp: 1 each, Rfl: 0    zolpidem (AMBIEN) 5 MG tablet, Take 1 tablet by mouth nightly as needed for Sleep for up to 30 doses. , Disp: 30 tablet, Rfl: 0    lisinopril (PRINIVIL;ZESTRIL) 20 MG tablet, Take 1 tablet by mouth in the morning., Disp: 90 tablet, Rfl: 3    pravastatin (PRAVACHOL) 40 MG tablet, Take 1 tablet by mouth in the morning., Disp: 90 tablet, Rfl: 3    omeprazole (PRILOSEC) 20 MG delayed release capsule, Take 1 capsule by mouth every morning (before breakfast) (Patient not taking: Reported on 1/15/2023), Disp: 30 capsule, Rfl: 1    aspirin 81 MG tablet, Take 81 mg by mouth daily, Disp: , Rfl:       SOCIAL HISTORY     Social History     Social History Narrative    Not on file     Social History     Tobacco Use    Smoking status: Never    Smokeless tobacco: Never   Vaping Use    Vaping Use: Never used   Substance Use Topics    Alcohol use: Yes     Comment: socially    Drug use: No         ALLERGIES     Allergies   Allergen Reactions    Pcn [Penicillins] Rash         FAMILY HISTORY     Family History   Problem Relation Age of Onset    Diabetes Mother     High Blood Pressure Mother     Vision Loss Mother     Arthritis Neg Hx     Asthma Neg Hx     Birth Defects Neg Hx     Cancer Neg Hx     Depression Neg Hx     Early Death Neg Hx     Hearing Loss Neg Hx     Heart Disease Neg Hx     High Cholesterol Neg Hx     Learning Disabilities Neg Hx     Kidney Disease Neg Hx     Mental Illness Neg Hx     Mental Retardation Neg Hx     Miscarriages / [place] Neg Hx     Stroke Neg Hx     Substance Abuse Neg Hx     Other Neg Hx          PHYSICAL EXAM     ED Triage Vitals [01/15/23 0805]   BP Temp Temp Source Heart Rate Resp SpO2 Height Weight   127/69 99 °F (37.2 °C) Oral 82 16 100 % 5' 4\" (1.626 m) 190 lb (86.2 kg)         Additional Vital Signs:  Vitals:    01/15/23 1028   BP: 112/62   Pulse: 70   Resp: 16   Temp:    SpO2: 94%       Physical Exam  Vitals and nursing note reviewed. Exam conducted with a chaperone present. Constitutional:       General: She is not in acute distress. Appearance: She is not ill-appearing or toxic-appearing. HENT:      Mouth/Throat:      Pharynx: No pharyngeal swelling. Cardiovascular:      Rate and Rhythm: Normal rate and regular rhythm. Heart sounds: Normal heart sounds. Pulmonary:      Effort: Pulmonary effort is normal.   Abdominal:      General: Abdomen is flat. Bowel sounds are normal. There is no distension. Palpations: Abdomen is soft. Tenderness: There is abdominal tenderness in the suprapubic area. There is right CVA tenderness. There is no left CVA tenderness or guarding. Hernia: No hernia is present. There is no hernia in the umbilical area, ventral area, left inguinal area, right femoral area, left femoral area or right inguinal area. Skin:     General: Skin is warm and dry. Findings: No rash. Neurological:      Mental Status: She is alert. INITIAL IMPRESSION AND DIFFERENTIALS   Initial Assessment: Given the patient's above chief complaint and findings on history and physical examination, I thought it was appropriate to consider the following emergency medical conditions:Uti, kidney stone, electrolyte abn, hernia, colitis,anemia, appy, shingles, mass, abscess, other. Although some of these diagnoses are unlikely they were considered in my medical decision making.     Plan: CBC, BMP, ECG,UA, CT a/p with contrast Symptomatic treatment with toradol, IVF, norco, and reassess Chronic Conditions considered:   Patient Active Problem List   Diagnosis    Hyperlipidemia    Insomnia    Essential hypertension    Elevated AST (SGOT)    IFG (impaired fasting glucose)         ED RESULTS   Laboratory results:  Labs Reviewed   CBC WITH AUTO DIFFERENTIAL - Abnormal; Notable for the following components:       Result Value    WBC 11.3 (*)     Segs Absolute 8.3 (*)     All other components within normal limits   COMPREHENSIVE METABOLIC PANEL - Abnormal; Notable for the following components:    Glucose 117 (*)     Sodium 130 (*)     Chloride 95 (*)     Albumin 3.3 (*)     ALT 10 (*)     All other components within normal limits   MICROSCOPIC URINALYSIS - Abnormal; Notable for the following components:    Blood, Urine SMALL (*)     Protein, UA TRACE (*)     Leukocytes, UA SMALL (*)     All other components within normal limits   OSMOLALITY - Abnormal; Notable for the following components:    Osmolality Calc 264.4 (*)     All other components within normal limits   LIPASE   TROPONIN   ANION GAP   GLOMERULAR FILTRATION RATE, ESTIMATED       Radiologic studies results:  CT ABDOMEN PELVIS W IV CONTRAST Additional Contrast? None   Final Result   1. Decreased cortical enhancement of the right kidney, right-sided hydronephrosis and hydroureter, likely secondary to a heterogeneous mass in the right distal ureter. This finding is highly suspicious for malignancy. 2. Exophytic lesion at the right uterus of indeterminate etiology, possibly a leiomyoma. Malignancy cannot be excluded. Pelvic MRI may be helpful for further evaluation. 3. Hypoattenuating lesion in the lower left pelvis, possibly in ovarian or paraovarian cyst.   4. Pancolonic diverticulosis. Final report electronically signed by Dr. Declan Jones on 1/15/2023 9:42 AM      CT LUMBAR RECONSTRUCTION WO POST PROCESS   Final Result      No fracture or spondylolisthesis of the lumbar spine.       Final report electronically signed by Dr. Olivia Andrade Lillian Chain on 1/15/2023 9:45 AM          ED Medications administered this visit:   Medications   ketorolac (TORADOL) injection 30 mg (30 mg IntraVENous Given 1/15/23 0937)   HYDROcodone-acetaminophen (NORCO) 5-325 MG per tablet 1 tablet (1 tablet Oral Given 1/15/23 0936)   ondansetron (ZOFRAN) injection 4 mg (4 mg IntraVENous Given 1/15/23 0937)   0.9 % sodium chloride bolus (0 mLs IntraVENous Stopped 1/15/23 1119)   iopamidol (ISOVUE-370) 76 % injection 80 mL (80 mLs IntraVENous Given 1/15/23 0919)   cefTRIAXone (ROCEPHIN) 1,000 mg in dextrose 5 % 50 mL IVPB mini-bag (0 mg IntraVENous Stopped 1/15/23 1119)         [facility]     ED Course as of 01/15/23 2259   Sun Riky 15, 2023   1031 Discussed with [facility]. Plan Op follow-up with urology. Patient to call office tomorrow for appointment. [DD]   1038 Leukocytes, UA(!): SMALL  Patient with low-grade temperature chills, dysuria and suprapubic pain. Plan to treat as concomitant UTI.  [DD]   80 CT ABDOMEN PELVIS W IV CONTRAST Additional Contrast? None  Plan to treat patient as complicated UTI. Patient will need to follow-up with urology for a cystoscopy and further management of possible tumor/malignancy. [DD]   1040 CT LUMBAR RECONSTRUCTION WO POST PROCESS  Negative spinal imaging. [DD]   2253 Sodium(!): 130  Mild hyponatremia with hypoosm likely dehydration. [DD]      ED Course User Index  [DD] Carlos Eduardo Santos DO     Available laboratory and imaging results were independently reviewed and clinically correlated. Decision Rules/Clinical Scores utilized:    none . none    Code Status:  Not addressed during this ED visit    Social determinants of health impacting treatment or disposition:   none    Medical Commodities impacting treatment or disposition:  Not Applicable.    Past Medical History:   Diagnosis Date    Herpes zoster 11/12    Hyperlipidemia     Hypertension        Consultants:  Urology[facility]wona    Final Assessment and Plan: 80 yo female with UTI symptoms over 3 days. Then 3 weeks worsening Right mid flank and RLQ pain. AFVSS. Mild suprapubic pain on exam.   UTI  Symptomatic. Complex UTI due to ureter lesion. Non-toxic. AFVSS. TX with rocephin and omnicef. Culture sent. Right ureteral/pelvic mass with hydroureter and hydronephrosis. Normal CMP   Urology consulted. Plan close follow-up   Hyponatremia  ED IV hydration for likely dehydration. The diagnosis, extensive differential diagnosis, plan of care, laboratory, and imaging findings were discussed at the bedside. All questions and concerns were addressed at the time of the encounter. MEDICATION CHANGES     DISCHARGE MEDICATIONS:  Discharge Medication List as of 1/15/2023 10:38 AM        START taking these medications    Details   cefdinir (OMNICEF) 300 MG capsule Take 1 capsule by mouth 2 times daily for 7 days, Disp-14 capsule, R-0Normal      HYDROcodone-acetaminophen (NORCO) 5-325 MG per tablet Take 1 tablet by mouth every 6 hours as needed for Pain for up to 3 days. Intended supply: 3 days. Take lowest dose possible to manage pain Max Daily Amount: 4 tablets, Disp-12 tablet, R-0Normal      naloxone 4 MG/0.1ML LIQD nasal spray 1 spray by Nasal route as needed for Opioid Reversal, Disp-1 each, R-0Normal                  FINAL DISPOSITION     Final diagnoses:   Pelvic mass   Acute cystitis without hematuria   Hydroureter on right   Ureteral mass   Abdominal pain, unspecified abdominal location     Condition: condition: fair  Dispo: Discharge to home    PATIENT REFERRED TO:  Mark Hastings MD  [address] [address] [phone]    Go in 1 day  appointment as scheduled    [facility]  [address] [facility]  Call in 1 day  To schedule a follow-up appointment in the next week.       Critical Care Time   CRITICAL CARE:  None    PROCEDURES: (None if blank)  Procedures:   MDM  Number of Diagnoses or Management Options  Abdominal pain, unspecified abdominal location: new, needed workup  Acute cystitis without hematuria: new, needed workup  Hydroureter on right: new, needed workup  Pelvic mass: new, needed workup  Ureteral mass: new, needed workup     Amount and/or Complexity of Data Reviewed  Clinical lab tests: ordered and reviewed  Tests in the radiology section of CPT®: ordered and reviewed  Tests in the medicine section of CPT®: ordered and reviewed  Obtain history from someone other than the patient: yes (Patient )  Discuss the patient with other providers: yes    Risk of Complications, Morbidity, and/or Mortality  Presenting problems: moderate  Diagnostic procedures: high  Management options: high        This transcription was electronically signed. Parts of this transcriptions may have been dictated by use of voice recognition software and electronically transcribed, and parts may have been transcribed with the assistance of an ED scribe. The transcription may contain errors not detected in proofreading.     Electronically Signed: Vashti Yanez DO, 01/15/23, 10:33 PM        Vashti Yanez DO  01/15/23 3830

## 2023-01-17 ENCOUNTER — PREP FOR PROCEDURE (OUTPATIENT)
Dept: UROLOGY | Age: 68
End: 2023-01-17

## 2023-01-17 ENCOUNTER — OFFICE VISIT (OUTPATIENT)
Dept: UROLOGY | Age: 68
End: 2023-01-17
Payer: MEDICARE

## 2023-01-17 ENCOUNTER — TELEPHONE (OUTPATIENT)
Dept: UROLOGY | Age: 68
End: 2023-01-17

## 2023-01-17 DIAGNOSIS — Z01.818 PREOP TESTING: ICD-10-CM

## 2023-01-17 DIAGNOSIS — N85.8 UTERINE MASS: ICD-10-CM

## 2023-01-17 DIAGNOSIS — N85.8 UTERINE MASS: Primary | ICD-10-CM

## 2023-01-17 DIAGNOSIS — N13.30 HYDRONEPHROSIS OF RIGHT KIDNEY: Primary | ICD-10-CM

## 2023-01-17 LAB
BACTERIA: NORMAL
BILIRUBIN URINE: NEGATIVE
BLOOD, URINE: NEGATIVE
CASTS: NORMAL /LPF
CASTS: NORMAL /LPF
CHARACTER, URINE: CLEAR
COLOR: YELLOW
CRYSTALS: NORMAL
EPITHELIAL CELLS, UA: NORMAL /HPF
GLUCOSE, URINE: NEGATIVE MG/DL
KETONES, URINE: NEGATIVE
LEUKOCYTE ESTERASE, URINE: NEGATIVE
MISCELLANEOUS LAB TEST RESULT: NORMAL
NITRITE, URINE: NEGATIVE
PH UA: 5.5 (ref 5–9)
PROTEIN UA: NEGATIVE MG/DL
RBC URINE: NORMAL /HPF
RENAL EPITHELIAL, UA: NORMAL
SPECIFIC GRAVITY UA: 1.02 (ref 1–1.03)
UROBILINOGEN, URINE: 0.2 EU/DL (ref 0–1)
WBC UA: NORMAL /HPF
YEAST: NORMAL

## 2023-01-17 PROCEDURE — 1123F ACP DISCUSS/DSCN MKR DOCD: CPT

## 2023-01-17 PROCEDURE — 99204 OFFICE O/P NEW MOD 45 MIN: CPT

## 2023-01-17 NOTE — TELEPHONE ENCOUNTER
Patient scheduled for MRI PELVIS W WO  at [facility] MR on 2/3/23 ARRIVAL OF 345PM FOR A 4PM SCAN.    Order  with instructions  given to the patient in the office

## 2023-01-17 NOTE — PROGRESS NOTES
Patient:  Bindu Lopez  YOB: 1955  Date: 1/17/2023    HISTORY OF PRESENT ILLNESS:   The patient is a 79 y.o. female who presents today for evaluation of the following problems: Pelvic Mass      1/17/23   -The patient presented to the ED on 1/15/2023 with concerns of abdominal pain that has been ongoing since [place]  -UA with mirco: small blood, small leukocytes, 3-5 RBC, 2-4 WBC   -WBC: 11.3   -Hemoglobin: 14.3  -Creatinine: 1.0      Overall the problem(s) : are worsening. No fevers or chills. No nausea or vomiting. Significant pain and poor appetite   Uterine mass as well, has appointment with GYN 2/10   Patient very anxious regarding results    Urinalysis today:  No results found for this visit on 01/17/23. Last BUN and creatinine:  Lab Results   Component Value Date    BUN 20 01/15/2023     Lab Results   Component Value Date    CREATININE 1.0 01/15/2023       Imaging Reviewed during this Office Visit:   (results were independently reviewed by physician and radiology report verified)  I independently reviewed and verified the images and reports from:    CT ABDOMEN PELVIS W IV CONTRAST Additional Contrast? None    Result Date: 1/15/2023  PROCEDURE: CT ABDOMEN PELVIS W IV CONTRAST CLINICAL INFORMATION: Right lower quadrant abdominal pain TECHNIQUE: CT of the abdomen and pelvis was performed following administration of 80 mL Isovue-370 intravenous contrast only. Axial images as well as coronal and sagittal reconstructions were obtained. All CT scans at this facility use dose modulation, iterative reconstruction, and/or weight-based dosing when appropriate to reduce radiation dose to as low as reasonably achievable. COMPARISON: None FINDINGS: Lower thorax: There is a calcified granuloma at the right lung base. No pleural effusion is identified. A small hiatal hernia suggested. Abdomen: There is no free intraperitoneal air or fluid.  Bowel is normal in course and caliber without evidence of obstruction. Diverticula are seen throughout the colon without evidence of diverticulitis. There is poor cortical enhancement of the right kidney and right-sided hydronephrosis. There is hydroureter on the right side. A heterogeneous partially enhancing lesion in or adjacent to the right distal ureter measures 5.1 x 4.8 cm (image 71). The liver, gallbladder, pancreas, spleen and adrenal glands are normal. There is a nonobstructive punctate calcification at the lower left kidney. A small exophytic hypoattenuating lesion at the lower left kidney may be a cyst. Atherosclerotic calcifications are present in the abdominal aorta without evidence of aneurysm. There is no mesenteric or retroperitoneal lymphadenopathy. No aggressive osseous lesions are identified in the lumbar spine. Pelvis: The urinary bladder is incompletely distended. The uterus is prominent. There is an exophytic hyperattenuating lesion arising from the right uterus which measures 4.8 x 4.0 cm (image 76). There is a 2.8 cm hypoattenuating lesion in the lower left  pelvis (image 70). Phleboliths are noted. There is no pelvic or inguinal lymphadenopathy. No aggressive osseous lesions are identified. 1. Decreased cortical enhancement of the right kidney, right-sided hydronephrosis and hydroureter, likely secondary to a heterogeneous mass in the right distal ureter. This finding is highly suspicious for malignancy. 2. Exophytic lesion at the right uterus of indeterminate etiology, possibly a leiomyoma. Malignancy cannot be excluded. Pelvic MRI may be helpful for further evaluation. 3. Hypoattenuating lesion in the lower left pelvis, possibly in ovarian or paraovarian cyst. 4. Pancolonic diverticulosis.  Final report electronically signed by Dr. Sobeida Jain on 1/15/2023 9:42 AM        PAST MEDICAL, FAMILY AND SOCIAL HISTORY:  Past Medical History:   Diagnosis Date    Herpes zoster 11/12    Hyperlipidemia     Hypertension      Past Surgical History: Procedure Laterality Date    DILATION AND CURETTAGE OF UTERUS      TOOTH EXTRACTION       Family History   Problem Relation Age of Onset    Diabetes Mother     High Blood Pressure Mother     Vision Loss Mother     Arthritis Neg Hx     Asthma Neg Hx     Birth Defects Neg Hx     Cancer Neg Hx     Depression Neg Hx     Early Death Neg Hx     Hearing Loss Neg Hx     Heart Disease Neg Hx     High Cholesterol Neg Hx     Learning Disabilities Neg Hx     Kidney Disease Neg Hx     Mental Illness Neg Hx     Mental Retardation Neg Hx     Miscarriages / Stillbirths Neg Hx     Stroke Neg Hx     Substance Abuse Neg Hx     Other Neg Hx      No outpatient medications have been marked as taking for the 1/17/23 encounter (Appointment) with Casa Alcocer PA-C. Pcn [penicillins]  Social History     Tobacco Use   Smoking Status Never   Smokeless Tobacco Never       Social History     Substance and Sexual Activity   Alcohol Use Yes    Comment: socially       REVIEW OF SYSTEMS:  Constitutional: negative  Eyes: negative  Respiratory: negative  Cardiovascular: negative  Gastrointestinal: negative  Genitourinary: negative except for what is in HPI  Musculoskeletal: negative  Skin: negative   Neurological: negative  Hematological/Lymphatic: negative  Psychological: negative    Physical Exam:    This a 79 y.o. female    There were no vitals filed for this visit. Constitutional: Patient in no acute distress. Neuro: Alert and oriented to person, place and time. Psych: mood and affect normal  HEENT negative  Lungs: Respiratory effort is normal  Cardiovascular: Normal peripheral pulses  Abdomen: Soft, non-tender, non-distended   Lymphatics: No palpable lymphadenopathy. Bladder non-tender and not distended.       Assessment and Plan   Mass in/near right distal ureter- suspicious for malignancy   Right hydronephrosis and hydroureter  -discussed case with Dr. Maulik Spring who recommended a right ureteroscopy and fish testing for further evaluation  -patient will need a stent following the procedure and this was discussed with her   3. Exophytic lesion at the right lesion of the right uterus- malignancy cannot be excluded  -Will ensure that the patient is following up with GYN  -Follow up on 2/10  4. Hypo attenuating lesion in the lower left pelvis   -possibly ovarian or paraovarian cyst  5. Acute cystitis    -was discharged from the ED with LAYLA REHMAN     -will order MRI of the pelvis   -patient needs cystoscopy, right ureteroscopy, with possible biopsy. Schedule for tomorrow with Dr. Yenifer Forbes   -Follow up with GYN- office visit on 2/10  -Continue Omnicef as prescribed  -Fish testing      Casa Cooley PA-C  Urology      Patient did not give enough urine for FISH testing. Will send for urine micro and culture.

## 2023-01-17 NOTE — TELEPHONE ENCOUNTER
DO NOT TAKE ASPIRIN,  FISH OIL, IBUPROFEN, MOTRIN-LIKE DRUGS AND ANY MULTIVITAMINS OR OVER THE COUNTER SUPPLEMENTS 14 DAYS PRIOR TO SURGERY. MUST HAVE AN ADULT OVER THE AGE OF 18 WITH YOU AT THE TIME OF THE PROCEDURE AND WITH YOU AT HOME AFTER THE PROCEDURE FOR 24 HOURS       Zoe Parks 1955 Diagnosis:     Surgical Physician: Dr. Norma Cole have been scheduled for the procedure marked below:      Surgery: Cystoscopy, Right ureteroscopy possible stent placement, possible biopsy        Date: 1/18/2023     Anesthesia: Anesthesiologist (General/Spinal)     Place of Service: [address] [place] [address] Second Floor Same Day Surgery         Arrive to same day surgery by:  1:00pm  (Surgery time is subject to change)      INSTRUCTIONS AS MARKED BELOW:    1.  DO NOT eat or drink anything after midnight before surgery. 2.  We prefer you shower or bathe with an antibacterial soap (Dial) the morning of surgery. 3  Please bring a current medication list, photo ID and insurance card(s) with you  4. Okay to take Tylenol  5. If you take Glucophage, Metformin or Janumet, hold 48-hours prior to surgery  6. Take blood pressure or heart medication as directed, if taken in the morning take with a small sip of water  7. The office will call you in 1-2 days after your procedure to schedule a follow up.             Date: 1/17/2023

## 2023-01-17 NOTE — TELEPHONE ENCOUNTER
Patient scheduled with Dr. Trish Borden on 1/18/2023. Surgery consent to be done upon arrival.  Surgery instructions gone over verbally with the patient in the office. Patient given a copy as well. Patient will have an adult over the age of 25 with them at discharge and 24 hours after procedure.

## 2023-01-17 NOTE — TELEPHONE ENCOUNTER
SURGERY [address]26  [address] [place] [address] [address] [address] [facility] [place] [address], [address] [employer]      Phone *[phone] *[phone]   Surgical Scheduling Direct Line Phone *[phone] Fax *[phone]      Garlan Epley 1955 female    [address] [facility] [address]  [address] [facility]   Marital Status:          Home Phone: [phone]      Cell Phone:    Telephone Information:   Mobile [phone]          Surgeon: Dr. Jean Kee Surgery Date: 1/18/2023   Time: to follow [facility]    Procedure: Cystoscopy, Right Ureteroscopy possible stent placement, with possible biopsy    Diagnosis: uterine mass     Important Medical History:  In Epic    Special Inst/Equip:     CPT Codes:    15159  Latex Allergy: No     Cardiac Device:  No    Anesthesia:  General          Admission Type:  Same Day                        Admit Prior to Day of Surgery: No    Case Location:  Main OR            Preadmission Testing:  Phone Call          PAT Date and Time:______________________________________________________    PAT Confirmation #: ______________________________________________________    Post Op Visit: ___________________________________________________________    Need Preop Cardiac Clearance: No    Does Patient have Cardiologist/physician?     none    Surgery Confirmation #: __________________________________________________    : ________________________   Date: __________________________     [employer] Name: Kavon Rascon

## 2023-01-18 ENCOUNTER — ANESTHESIA EVENT (OUTPATIENT)
Dept: OPERATING ROOM | Age: 68
End: 2023-01-18
Payer: MEDICARE

## 2023-01-18 ENCOUNTER — HOSPITAL ENCOUNTER (OUTPATIENT)
Age: 68
Setting detail: OUTPATIENT SURGERY
Discharge: HOME | End: 2023-01-18
Attending: UROLOGY | Admitting: UROLOGY
Payer: MEDICARE

## 2023-01-18 ENCOUNTER — ANESTHESIA (OUTPATIENT)
Dept: OPERATING ROOM | Age: 68
End: 2023-01-18
Payer: MEDICARE

## 2023-01-18 VITALS
OXYGEN SATURATION: 99 % | RESPIRATION RATE: 20 BRPM | SYSTOLIC BLOOD PRESSURE: 128 MMHG | BODY MASS INDEX: 30.25 KG/M2 | HEIGHT: 64 IN | DIASTOLIC BLOOD PRESSURE: 66 MMHG | TEMPERATURE: 95.9 F | WEIGHT: 177.2 LBS | HEART RATE: 67 BPM

## 2023-01-18 DIAGNOSIS — G89.18 POSTOPERATIVE PAIN: Primary | ICD-10-CM

## 2023-01-18 PROCEDURE — 6360000002 HC RX W HCPCS: Performed by: REGISTERED NURSE

## 2023-01-18 PROCEDURE — 7100000010 HC PHASE II RECOVERY - FIRST 15 MIN: Performed by: UROLOGY

## 2023-01-18 PROCEDURE — C2617 STENT, NON-COR, TEM W/O DEL: HCPCS | Performed by: UROLOGY

## 2023-01-18 PROCEDURE — 6370000000 HC RX 637 (ALT 250 FOR IP): Performed by: UROLOGY

## 2023-01-18 PROCEDURE — 2580000003 HC RX 258: Performed by: UROLOGY

## 2023-01-18 PROCEDURE — 6360000002 HC RX W HCPCS: Performed by: UROLOGY

## 2023-01-18 PROCEDURE — 2709999900 HC NON-CHARGEABLE SUPPLY: Performed by: UROLOGY

## 2023-01-18 PROCEDURE — 3600000002 HC SURGERY LEVEL 2 BASE: Performed by: UROLOGY

## 2023-01-18 PROCEDURE — 6360000004 HC RX CONTRAST MEDICATION: Performed by: UROLOGY

## 2023-01-18 PROCEDURE — 7100000000 HC PACU RECOVERY - FIRST 15 MIN: Performed by: UROLOGY

## 2023-01-18 PROCEDURE — 7100000011 HC PHASE II RECOVERY - ADDTL 15 MIN: Performed by: UROLOGY

## 2023-01-18 PROCEDURE — 3700000000 HC ANESTHESIA ATTENDED CARE: Performed by: UROLOGY

## 2023-01-18 PROCEDURE — 3600000012 HC SURGERY LEVEL 2 ADDTL 15MIN: Performed by: UROLOGY

## 2023-01-18 PROCEDURE — C1747 HC ENDOSCOPE, SINGLE, URINARY TRACT: HCPCS | Performed by: UROLOGY

## 2023-01-18 PROCEDURE — 7100000001 HC PACU RECOVERY - ADDTL 15 MIN: Performed by: UROLOGY

## 2023-01-18 PROCEDURE — C1769 GUIDE WIRE: HCPCS | Performed by: UROLOGY

## 2023-01-18 PROCEDURE — 3700000001 HC ADD 15 MINUTES (ANESTHESIA): Performed by: UROLOGY

## 2023-01-18 DEVICE — URETERAL STENT
Type: IMPLANTABLE DEVICE | Site: URETER | Status: FUNCTIONAL
Brand: PERCUFLEX™ PLUS

## 2023-01-18 RX ORDER — DEXAMETHASONE SODIUM PHOSPHATE 10 MG/ML
INJECTION, EMULSION INTRAMUSCULAR; INTRAVENOUS PRN
Status: DISCONTINUED | OUTPATIENT
Start: 2023-01-18 | End: 2023-01-18 | Stop reason: SDUPTHER

## 2023-01-18 RX ORDER — SODIUM CHLORIDE 0.9 % (FLUSH) 0.9 %
5-40 SYRINGE (ML) INJECTION PRN
Status: CANCELLED | OUTPATIENT
Start: 2023-01-18

## 2023-01-18 RX ORDER — SODIUM CHLORIDE 0.9 % (FLUSH) 0.9 %
5-40 SYRINGE (ML) INJECTION EVERY 12 HOURS SCHEDULED
Status: CANCELLED | OUTPATIENT
Start: 2023-01-18

## 2023-01-18 RX ORDER — FENTANYL CITRATE 50 UG/ML
INJECTION, SOLUTION INTRAMUSCULAR; INTRAVENOUS PRN
Status: DISCONTINUED | OUTPATIENT
Start: 2023-01-18 | End: 2023-01-18 | Stop reason: SDUPTHER

## 2023-01-18 RX ORDER — SODIUM CHLORIDE 0.9 % (FLUSH) 0.9 %
5-40 SYRINGE (ML) INJECTION EVERY 12 HOURS SCHEDULED
Status: DISCONTINUED | OUTPATIENT
Start: 2023-01-18 | End: 2023-01-18 | Stop reason: HOSPADM

## 2023-01-18 RX ORDER — OXYBUTYNIN CHLORIDE 10 MG/1
10 TABLET, EXTENDED RELEASE ORAL DAILY
Qty: 30 TABLET | Refills: 3 | Status: SHIPPED | OUTPATIENT
Start: 2023-01-18 | End: 2023-02-17

## 2023-01-18 RX ORDER — SODIUM CHLORIDE 9 MG/ML
INJECTION, SOLUTION INTRAVENOUS PRN
Status: CANCELLED | OUTPATIENT
Start: 2023-01-18

## 2023-01-18 RX ORDER — ONDANSETRON 2 MG/ML
INJECTION INTRAMUSCULAR; INTRAVENOUS PRN
Status: DISCONTINUED | OUTPATIENT
Start: 2023-01-18 | End: 2023-01-18 | Stop reason: SDUPTHER

## 2023-01-18 RX ORDER — HYDROCODONE BITARTRATE AND ACETAMINOPHEN 5; 325 MG/1; MG/1
1 TABLET ORAL ONCE
Status: COMPLETED | OUTPATIENT
Start: 2023-01-18 | End: 2023-01-18

## 2023-01-18 RX ORDER — SODIUM CHLORIDE 0.9 % (FLUSH) 0.9 %
5-40 SYRINGE (ML) INJECTION PRN
Status: DISCONTINUED | OUTPATIENT
Start: 2023-01-18 | End: 2023-01-18 | Stop reason: HOSPADM

## 2023-01-18 RX ORDER — ONDANSETRON 2 MG/ML
4 INJECTION INTRAMUSCULAR; INTRAVENOUS
Status: CANCELLED | OUTPATIENT
Start: 2023-01-18 | End: 2023-01-19

## 2023-01-18 RX ORDER — FENTANYL CITRATE 50 UG/ML
50 INJECTION, SOLUTION INTRAMUSCULAR; INTRAVENOUS EVERY 5 MIN PRN
Status: CANCELLED | OUTPATIENT
Start: 2023-01-18

## 2023-01-18 RX ORDER — OXYCODONE HYDROCHLORIDE AND ACETAMINOPHEN 5; 325 MG/1; MG/1
1 TABLET ORAL EVERY 6 HOURS PRN
Qty: 12 TABLET | Refills: 0 | Status: SHIPPED | OUTPATIENT
Start: 2023-01-18 | End: 2023-01-23

## 2023-01-18 RX ORDER — PROPOFOL 10 MG/ML
INJECTION, EMULSION INTRAVENOUS PRN
Status: DISCONTINUED | OUTPATIENT
Start: 2023-01-18 | End: 2023-01-18 | Stop reason: SDUPTHER

## 2023-01-18 RX ORDER — SODIUM CHLORIDE 9 MG/ML
INJECTION, SOLUTION INTRAVENOUS PRN
Status: DISCONTINUED | OUTPATIENT
Start: 2023-01-18 | End: 2023-01-18 | Stop reason: HOSPADM

## 2023-01-18 RX ORDER — ONDANSETRON 4 MG/1
4 TABLET, FILM COATED ORAL EVERY 8 HOURS PRN
COMMUNITY

## 2023-01-18 RX ORDER — OXYBUTYNIN CHLORIDE 5 MG/1
10 TABLET, EXTENDED RELEASE ORAL NIGHTLY
Status: DISCONTINUED | OUTPATIENT
Start: 2023-01-18 | End: 2023-01-18 | Stop reason: HOSPADM

## 2023-01-18 RX ADMIN — HYDROCODONE BITARTRATE AND ACETAMINOPHEN 1 TABLET: 5; 325 TABLET ORAL at 17:42

## 2023-01-18 RX ADMIN — CEFAZOLIN 2000 MG: 10 INJECTION, POWDER, FOR SOLUTION INTRAVENOUS at 15:49

## 2023-01-18 RX ADMIN — SODIUM CHLORIDE: 9 INJECTION, SOLUTION INTRAVENOUS at 15:41

## 2023-01-18 RX ADMIN — DEXAMETHASONE SODIUM PHOSPHATE 4 MG: 10 INJECTION, EMULSION INTRAMUSCULAR; INTRAVENOUS at 15:45

## 2023-01-18 RX ADMIN — ONDANSETRON 4 MG: 2 INJECTION INTRAMUSCULAR; INTRAVENOUS at 16:02

## 2023-01-18 RX ADMIN — FENTANYL CITRATE 25 MCG: 50 INJECTION, SOLUTION INTRAMUSCULAR; INTRAVENOUS at 15:52

## 2023-01-18 RX ADMIN — PROPOFOL 200 MG: 10 INJECTION, EMULSION INTRAVENOUS at 15:45

## 2023-01-18 RX ADMIN — Medication 100 MG: at 15:45

## 2023-01-18 RX ADMIN — OXYBUTYNIN CHLORIDE 10 MG: 5 TABLET, EXTENDED RELEASE ORAL at 19:05

## 2023-01-18 ASSESSMENT — PAIN SCALES - GENERAL
PAINLEVEL_OUTOF10: 5
PAINLEVEL_OUTOF10: 5
PAINLEVEL_OUTOF10: 4
PAINLEVEL_OUTOF10: 3
PAINLEVEL_OUTOF10: 3

## 2023-01-18 ASSESSMENT — PAIN - FUNCTIONAL ASSESSMENT: PAIN_FUNCTIONAL_ASSESSMENT: 0-10

## 2023-01-18 ASSESSMENT — PAIN DESCRIPTION - LOCATION
LOCATION: GROIN
LOCATION: GROIN

## 2023-01-18 ASSESSMENT — PAIN DESCRIPTION - PAIN TYPE: TYPE: SURGICAL PAIN

## 2023-01-18 ASSESSMENT — PAIN DESCRIPTION - DESCRIPTORS: DESCRIPTORS: ACHING

## 2023-01-18 NOTE — PROGRESS NOTES
Pharmacy contacted regarding RN unable to remove ditropan from om[facility]ell. Medication prescribed per [facility] but [facility] stating medication not approved and RN unable to override.

## 2023-01-18 NOTE — PROGRESS NOTES
Pt returned SDS room 15. Report received from PACU. Assessment and vitals as charted. Soda and muffin at bedside. Pt and family verbalized understanding of discharge criteria. Call light within reach.

## 2023-01-18 NOTE — H&P
Talisha Turk MD  History and Physical    Patient:  Marilyn Edwards  MRN: [id number]  YOB: 1955    HISTORY OF PRESENT ILLNESS:     The patient is a 79 y.o. female who presents with right flank pain . Here for procedure. Patient's old records, notes and chart reviewed and summarized above. Talisha Turk MD independently reviewed the images and verified the radiology reports from:    CT ABDOMEN PELVIS W IV CONTRAST Additional Contrast? None    Result Date: 1/15/2023  PROCEDURE: CT ABDOMEN PELVIS W IV CONTRAST CLINICAL INFORMATION: Right lower quadrant abdominal pain TECHNIQUE: CT of the abdomen and pelvis was performed following administration of 80 mL Isovue-370 intravenous contrast only. Axial images as well as coronal and sagittal reconstructions were obtained. All CT scans at this facility use dose modulation, iterative reconstruction, and/or weight-based dosing when appropriate to reduce radiation dose to as low as reasonably achievable. COMPARISON: None FINDINGS: Lower thorax: There is a calcified granuloma at the right lung base. No pleural effusion is identified. A small hiatal hernia suggested. Abdomen: There is no free intraperitoneal air or fluid. Bowel is normal in course and caliber without evidence of obstruction. Diverticula are seen throughout the colon without evidence of diverticulitis. There is poor cortical enhancement of the right kidney and right-sided hydronephrosis. There is hydroureter on the right side. A heterogeneous partially enhancing lesion in or adjacent to the right distal ureter measures 5.1 x 4.8 cm (image 71). The liver, gallbladder, pancreas, spleen and adrenal glands are normal. There is a nonobstructive punctate calcification at the lower left kidney. A small exophytic hypoattenuating lesion at the lower left kidney may be a cyst. Atherosclerotic calcifications are present in the abdominal aorta without evidence of aneurysm.  There is no mesenteric or retroperitoneal lymphadenopathy. No aggressive osseous lesions are identified in the lumbar spine. Pelvis: The urinary bladder is incompletely distended. The uterus is prominent. There is an exophytic hyperattenuating lesion arising from the right uterus which measures 4.8 x 4.0 cm (image 76). There is a 2.8 cm hypoattenuating lesion in the lower left  pelvis (image 70). Phleboliths are noted. There is no pelvic or inguinal lymphadenopathy. No aggressive osseous lesions are identified. 1. Decreased cortical enhancement of the right kidney, right-sided hydronephrosis and hydroureter, likely secondary to a heterogeneous mass in the right distal ureter. This finding is highly suspicious for malignancy. 2. Exophytic lesion at the right uterus of indeterminate etiology, possibly a leiomyoma. Malignancy cannot be excluded. Pelvic MRI may be helpful for further evaluation. 3. Hypoattenuating lesion in the lower left pelvis, possibly in ovarian or paraovarian cyst. 4. Pancolonic diverticulosis. Final report electronically signed by Dr. Donny Kaufman on 1/15/2023 9:42 AM    CT LUMBAR RECONSTRUCTION WO POST PROCESS    Result Date: 1/15/2023  PROCEDURE: CT LUMBAR RECONSTRUCTION WO POST PROCESS CLINICAL INFORMATION: Right-sided pain TECHNIQUE: CT of the lumbar spine was reconstructed from same-day CT of the abdomen and pelvis. Axial, coronal and sagittal projections were obtained. All CT scans at this facility use dose modulation, iterative reconstruction, and/or weight-based dosing when appropriate to reduce radiation dose to as low as reasonably achievable. COMPARISON: None FINDINGS: This report refers to findings in the lumbar spine only. Please see same-day CT of the abdomen and pelvis for additional findings. Lumbar vertebral body heights and alignment are preserved. There is mild disc space narrowing at the L3-L4 and L4-L5 levels. There is no acute fracture or spondylolisthesis.  No significant posterior facet arthropathy is identified. No aggressive osseous lesions are present. No fracture or spondylolisthesis of the lumbar spine. Final report electronically signed by Dr. Tavares Monreal on 1/15/2023 9:45 AM        Past Medical History:    Past Medical History:   Diagnosis Date    Herpes zoster 11/12    Hyperlipidemia     Hypertension        Past Surgical History:    Past Surgical History:   Procedure Laterality Date    DILATION AND CURETTAGE OF UTERUS      TOOTH EXTRACTION         Medications Prior to Admission:    Prior to Admission medications    Medication Sig Start Date End Date Taking? Authorizing Provider   acetaminophen (TYLENOL) 500 MG tablet Take 1,000 mg by mouth every 6 hours as needed for Pain    Historical Provider, MD   cefdinir (OMNICEF) 300 MG capsule Take 1 capsule by mouth 2 times daily for 7 days 1/15/23 1/22/23  Diogenes Farmer DO   HYDROcodone-acetaminophen (NORCO) 5-325 MG per tablet Take 1 tablet by mouth every 6 hours as needed for Pain for up to 3 days. Intended supply: 3 days. Take lowest dose possible to manage pain Max Daily Amount: 4 tablets 1/15/23 1/18/23  Diogenes Farmer DO   naloxone 4 MG/0.1ML LIQD nasal spray 1 spray by Nasal route as needed for Opioid Reversal 1/15/23   Diogenes Farmer DO   zolpidem (AMBIEN) 5 MG tablet Take 1 tablet by mouth nightly as needed for Sleep for up to 30 doses. 8/9/22 2/11/23  Montana Liu MD   lisinopril (PRINIVIL;ZESTRIL) 20 MG tablet Take 1 tablet by mouth in the morning. 8/1/22 8/2/23  Montana Liu MD   pravastatin (PRAVACHOL) 40 MG tablet Take 1 tablet by mouth in the morning.  8/1/22   Montana Liu MD   omeprazole (PRILOSEC) 20 MG delayed release capsule Take 1 capsule by mouth every morning (before breakfast)  Patient not taking: Reported on 1/15/2023 1/25/22   Montana Liu MD   aspirin 81 MG tablet Take 81 mg by mouth daily    Historical Provider, MD       Allergies:  Pcn [penicillins]    Social History:    Social History Socioeconomic History    Marital status:       Spouse name: Not on file    Number of children: Not on file    Years of education: Not on file    Highest education level: Not on file   Occupational History    Not on file   Tobacco Use    Smoking status: Never    Smokeless tobacco: Never   Vaping Use    Vaping Use: Never used   Substance and Sexual Activity    Alcohol use: Yes     Comment: socially    Drug use: No    Sexual activity: Yes     Partners: Male   Other Topics Concern    Not on file   Social History Narrative    Not on file     Social Determinants of Health     Financial Resource Strain: Low Risk     Difficulty of Paying Living Expenses: Not hard at all   Food Insecurity: No Food Insecurity    Worried About Running Out of Food in the Last Year: Never true    Ran Out of Food in the Last Year: Never true   Transportation Needs: Not on file   Physical Activity: Insufficiently Active    Days of Exercise per Week: 1 day    Minutes of Exercise per Session: 20 min   Stress: Not on file   Social Connections: Not on file   Intimate Partner Violence: Not on file   Housing Stability: Not on file       Family History:    Family History   Problem Relation Age of Onset    Diabetes Mother     High Blood Pressure Mother     Vision Loss Mother     Arthritis Neg Hx     Asthma Neg Hx     Birth Defects Neg Hx     Cancer Neg Hx     Depression Neg Hx     Early Death Neg Hx     Hearing Loss Neg Hx     Heart Disease Neg Hx     High Cholesterol Neg Hx     Learning Disabilities Neg Hx     Kidney Disease Neg Hx     Mental Illness Neg Hx     Mental Retardation Neg Hx     Miscarriages / Stillbirths Neg Hx     Stroke Neg Hx     Substance Abuse Neg Hx     Other Neg Hx        REVIEW OF SYSTEMS:  Constitutional: negative  Eyes: negative  Respiratory: negative  Cardiovascular: negative  Gastrointestinal: negative  Genitourinary: no acute issues  Musculoskeletal: negative  Skin: negative   Neurological: negative  Hematological/Lymphatic: negative  Psychological: negative    Physical Exam:      No data found. Constitutional: Patient in no acute distress; Neuro: alert and oriented to person place and time. Psych: Mood and affect normal.  Skin: Normal  Lungs: Respiratory effort normal, CTA  Cardiovascular:  Normal peripheral pulses; no murmur. Normal rhythm  Abdomen: Soft, non-tender, non-distended with no CVA, flank pain, hepatosplenomegaly or hernia. Kidneys normal.  Bladder non-tender and not distended.       LABS:   Recent Labs     01/15/23  0830   WBC 11.3*   HGB 14.3   HCT 42.4   MCV 94.0        Recent Labs     01/15/23  0830   *   K 4.6   CL 95*   CO2 23   BUN 20   CREATININE 1.0     No results found for: PSA      Urinalysis:   Recent Labs     01/17/23  1440   COLORU YELLOW   PHUR 5.5   LABCAST NONE SEEN  NONE SEEN   WBCUA 2-4   RBCUA 0-2   YEAST NONE SEEN   BACTERIA NONE SEEN   SPECGRAV 1.017   LEUKOCYTESUR NEGATIVE   UROBILINOGEN 0.2   BILIRUBINUR NEGATIVE   BLOODU NEGATIVE        -----------------------------------------------------------------      Assessment and Plan     Impression:    Patient Active Problem List   Diagnosis    Hyperlipidemia    Insomnia    Essential hypertension    Elevated AST (SGOT)    IFG (impaired fasting glucose)       Plan:     Consent obtained; right ureteroscopy poss biopsy and stent in OR today    Iza Ozuna MD  6:53 AM 1/18/2023

## 2023-01-18 NOTE — ANESTHESIA POSTPROCEDURE EVALUATION
Department of Anesthesiology  Postprocedure Note    Patient: Marla Merlos  MRN: [id number]  YOB: 1955  Date of evaluation: 1/18/2023      Procedure Summary     Date: 01/18/23 Room / Location: [facility] / [address]    Anesthesia Start: 5286 Anesthesia Stop: 8151    Procedure: Cystoscopy Right Ureteroscopy, Stent Placement retrograde pyelogram (Right) Diagnosis:       Uterine mass      (Uterine mass [N85.8])    Surgeons: Booker Rae MD Responsible Provider: Rosina Reeder MD    Anesthesia Type: general ASA Status: 2          Anesthesia Type: No value filed.     Skip Phase I: Skip Score: 5    Skip Phase II:        Anesthesia Post Evaluation    Patient location during evaluation: PACU  Patient participation: complete - patient participated  Level of consciousness: awake  Airway patency: patent  Nausea & Vomiting: no nausea  Complications: no  Cardiovascular status: hemodynamically stable  Respiratory status: acceptable  Hydration status: stable

## 2023-01-18 NOTE — ANESTHESIA PRE PROCEDURE
Department of Anesthesiology  Preprocedure Note       Name:  Zoe Parks   Age:  67 y.o.  :  1955                                          MRN:  [id number]         Date:  2023      Surgeon: Surgeon(s):  Mykel March MD    Procedure: Procedure(s):  Cystoscopy Right Ureteroscopy possible Stent Placement possible Biopsy    Medications prior to admission:   Prior to Admission medications    Medication Sig Start Date End Date Taking? Authorizing Provider   ondansetron (ZOFRAN) 4 MG tablet Take 4 mg by mouth every 8 hours as needed for Nausea or Vomiting   Yes Historical Provider, MD   acetaminophen (TYLENOL) 500 MG tablet Take 1,000 mg by mouth every 6 hours as needed for Pain    Historical Provider, MD   cefdinir (OMNICEF) 300 MG capsule Take 1 capsule by mouth 2 times daily for 7 days 1/15/23 1/22/23  John Pulliam DO   HYDROcodone-acetaminophen (NORCO) 5-325 MG per tablet Take 1 tablet by mouth every 6 hours as needed for Pain for up to 3 days. Intended supply: 3 days. Take lowest dose possible to manage pain Max Daily Amount: 4 tablets 1/15/23 1/18/23  John Pulliam DO   naloxone 4 MG/0.1ML LIQD nasal spray 1 spray by Nasal route as needed for Opioid Reversal 1/15/23   John Pulliam DO   zolpidem (AMBIEN) 5 MG tablet Take 1 tablet by mouth nightly as needed for Sleep for up to 30 doses. 22  Beulah Ricks MD   lisinopril (PRINIVIL;ZESTRIL) 20 MG tablet Take 1 tablet by mouth in the morning. 22  Beulah Ricks MD   pravastatin (PRAVACHOL) 40 MG tablet Take 1 tablet by mouth in the morning. 22   Beulah Ricks MD   omeprazole (PRILOSEC) 20 MG delayed release capsule Take 1 capsule by mouth every morning (before breakfast) 22   Beulah Ricks MD   aspirin 81 MG tablet Take 81 mg by mouth daily    Historical Provider, MD       Current medications:    Current Facility-Administered Medications   Medication Dose Route Frequency Provider Last Rate Last Admin   sodium chloride flush 0.9 % injection 5-40 mL  5-40 mL IntraVENous 2 times per day Mariana Cisneros MD        sodium chloride flush 0.9 % injection 5-40 mL  5-40 mL IntraVENous PRN Mariana Cisneros MD        0.9 % sodium chloride infusion   IntraVENous PRN Mariana Cisneros MD        ceFAZolin (ANCEF) 2000 mg in dextrose 5 % 50 mL IVPB  2,000 mg IntraVENous On Call to [id number] [place], [place] [address], MD           Allergies: Allergies   Allergen Reactions    Pcn [Penicillins] Rash       Problem List:    Patient Active Problem List   Diagnosis Code    Hyperlipidemia E78.5    Insomnia G47.00    Essential hypertension I10    Elevated AST (SGOT) R74.01    IFG (impaired fasting glucose) R73.01       Past Medical History:        Diagnosis Date    Herpes zoster 11/12    Hyperlipidemia     Hypertension        Past Surgical History:        Procedure Laterality Date    DILATION AND CURETTAGE OF UTERUS      TOOTH EXTRACTION         Social History:    Social History     Tobacco Use    Smoking status: Never    Smokeless tobacco: Never   Substance Use Topics    Alcohol use: Not Currently     Comment: socially                                Counseling given: Not Answered      Vital Signs (Current):   Vitals:    01/18/23 1312   BP: 125/61   Pulse: 72   Resp: 20   Temp: 97.3 °F (36.3 °C)   TempSrc: Tympanic   SpO2: 97%   Weight: 177 lb 3.2 oz (80.4 kg)   Height: 5' 4\" (1.626 m)                                              BP Readings from Last 3 Encounters:   01/18/23 125/61   01/15/23 112/62   09/27/22 134/76       NPO Status: Time of last liquid consumption: 2200                        Time of last solid consumption: 2200                        Date of last liquid consumption: 01/17/23                        Date of last solid food consumption: 01/17/23    BMI:   Wt Readings from Last 3 Encounters:   01/18/23 177 lb 3.2 oz (80.4 kg)   01/15/23 190 lb (86.2 kg)   09/27/22 187 lb (84.8 kg)     Body mass index is 30.42 kg/m².     CBC:   Lab Results Component Value Date/Time    WBC 11.3 01/15/2023 08:30 AM    RBC 4.51 01/15/2023 08:30 AM    HGB 14.3 01/15/2023 08:30 AM    HCT 42.4 01/15/2023 08:30 AM    MCV 94.0 01/15/2023 08:30 AM     01/15/2023 08:30 AM       CMP:   Lab Results   Component Value Date/Time     01/15/2023 08:30 AM    K 4.6 01/15/2023 08:30 AM    CL 95 01/15/2023 08:30 AM    CO2 23 01/15/2023 08:30 AM    BUN 20 01/15/2023 08:30 AM    CREATININE 1.0 01/15/2023 08:30 AM    LABGLOM >60 01/15/2023 08:30 AM    GLUCOSE 117 01/15/2023 08:30 AM    PROT 6.9 01/15/2023 08:30 AM    CALCIUM 9.0 01/15/2023 08:30 AM    BILITOT 0.5 01/15/2023 08:30 AM    ALKPHOS 70 01/15/2023 08:30 AM    AST 13 01/15/2023 08:30 AM    ALT 10 01/15/2023 08:30 AM       POC Tests: No results for input(s): POCGLU, POCNA, POCK, POCCL, POCBUN, POCHEMO, POCHCT in the last 72 hours.     Coags: No results found for: PROTIME, INR, APTT    HCG (If Applicable): No results found for: PREGTESTUR, PREGSERUM, HCG, HCGQUANT     ABGs: No results found for: PHART, PO2ART, SOF0NXU, PPI8LJV, BEART, O0ULERYQ     Type & Screen (If Applicable):  No results found for: LABABO, LABRH    Drug/Infectious Status (If Applicable):  No results found for: HIV, HEPCAB    COVID-19 Screening (If Applicable): No results found for: COVID19        Anesthesia Evaluation  Patient summary reviewed and Nursing notes reviewed no history of anesthetic complications:   Airway: Mallampati: II  TM distance: >3 FB   Neck ROM: full  Mouth opening: > = 3 FB   Dental:          Pulmonary:Negative Pulmonary ROS and normal exam  breath sounds clear to auscultation                             Cardiovascular:  Exercise tolerance: good (>4 METS),   (+) hypertension:,                   Neuro/Psych:   Negative Neuro/Psych ROS              GI/Hepatic/Renal:   (+) renal disease: kidney stones,           Endo/Other: Negative Endo/Other ROS             Pt had no PAT visit       Abdominal:             Vascular: negative vascular ETHAN.         Other Findings:           Anesthesia Plan      general     ASA 2       Induction: intravenous. MIPS: Postoperative opioids intended and Prophylactic antiemetics administered. Anesthetic plan and risks discussed with patient. Plan discussed with CRNA.                     [address],    1/18/2023

## 2023-01-18 NOTE — DISCHARGE INSTRUCTIONS
Pt ok to discharge home in good condition  No heavy lifting, >10 lbs for today  Pt should avoid strenuous activity for today  Pt should walk moderately at home  Pt ok to shower   Pt may resume diet as tolerated  Pt should take Rx as directed  No driving while on narcotics  Please call attending physician or hospital  with questions  Call or Present to ED if fever (> 101F), intractable nausea vomiting or pain.   Rx in chart    Pt should follow up with Sydni Kennedy MD, in 4 weeks, call to confirm appointment

## 2023-01-18 NOTE — BRIEF OP NOTE
Brief Postoperative Note      Patient: Steph Caal  YOB: 1955  MRN: [id number]    Date of Procedure: 1/18/2023    Pre-Op Diagnosis: Uterine mass [N85.8]    Post-Op Diagnosis: Same       Procedure(s):  Cystoscopy Right Ureteroscopy, Stent Placement retrograde pyelogram    Surgeon(s):  Leda Joy MD    Assistant:  * No surgical staff found *    Anesthesia: General    Estimated Blood Loss (mL): Minimal    Complications: None    Specimens:   * No specimens in log *    Implants:  Implant Name Type Inv. Item Serial No.  Lot No. LRB No. Used Action   STENT URET 6FR L26CM HYDR+ PGTL Valerie Trammell  - [id number]  [facility] 6FR L26CM HYDR+ PGTL TAPR TIP GRAD BLDR MRK LO  [facility]- [id number] Right 1 Implanted         Drains: * No LDAs found *    Findings: extrinsic compression. No ureteral mass noted. 3 cm segment pelvic inlet obstructed. Needs stent until this is addressed. Cysto stent exchange again in three months.  Austyn elder 2-3 weeks    Electronically signed by Zac Knapp MD on 1/18/2023 at 4:28 PM

## 2023-01-18 NOTE — PROGRESS NOTES
Pt admitted to [facility]  and oriented to unit. SCD sleeves applied. Nares swabbed. Pt verbalized permission for first name, last initial and physicians name on white board. SDS board and discharge criteria explained, pt and family verbalized understanding. Pt denies thoughts of harming self or others. Call light in reach. Family at the bedside.

## 2023-01-18 NOTE — PROGRESS NOTES
1610 non reactive on arrival to PACU with spontaneous resp and NC   1620 awake and oriented , O2 off ,pt c/o  mild Rt flank pain , drifts back to sleep easily   1645 awakens easily to name , states flank pain tolerable , just pressure like she needs to void , 1647 meets criteria for discharge , transported to [facility]

## 2023-01-19 ENCOUNTER — TELEPHONE (OUTPATIENT)
Dept: UROLOGY | Age: 68
End: 2023-01-19

## 2023-01-19 LAB — URINE CULTURE, ROUTINE: NORMAL

## 2023-01-19 NOTE — PROGRESS NOTES
Pt has met discharge criteria. IV removed, gauze and tape applied. Dressed in own clothes and personal belongings gathered. Discharge instructions (with opioid medication education information) given to pt and significant other. Pt and significant other verbalized understanding of discharge instructions, prescriptions and follow up appointments. Pt transported to discharge lobby by [facility] staff.

## 2023-01-19 NOTE — TELEPHONE ENCOUNTER
Spoke to Hina Moss at OB/GYN faxed over brief op note (op note not complete yet) she will speak with the provider and reach out to the patient requesting a sooner appointment date

## 2023-01-19 NOTE — TELEPHONE ENCOUNTER
Patient is to call the office back to let us know what OB/GYN she is going to see.  Per Dr. Randall Clement he wants her seen sooner with them he would like us to call to get the appointment moved up

## 2023-01-23 NOTE — OP NOTE
[address] [place] [address]. [place]    DATE: 1/18/2023  Patient:  Brynn Watson  MRN: [id number]  YOB: 1955    SURGEON: Isaac Woody MD.    ASSISTANT: none    PREOPERATIVE DIAGNOSIS:  right hydronephrosis       POSTOPERATIVE DIAGNOSIS:   right hydronephrosis       PROCEDURE PERFORMED: cystoscopy, right ureteroscopy  right retrograde pyelogram   right stent placement    ANESTHESIA: General    COMPLICATIONS: none    OR BLOOD LOSS:  Minimal    FLUIDS: Cystalloids per Anesthesia    SPECIMENS:  * No specimens in log *  none    DRAINS: 6 x 26 dbl j stent no string    INDICATIONS FOR PROCEDURE:  The patient is a 79 y.o. female who presents today with Uterine mass [N85.8] here for Cystoscopy Right Ureteroscopy, Stent Placement retrograde pyelogram. After risks, benefits and alternatives of the procedure were discussed with the patient, the patient elected to proceed. DETAILS OF PROCEDURE:  After informed consent was obtained in the preoperative area, the patient was taken back to the operating room and transferred to the operating table in supine position. Anesthesia was induced and antibiotics were given. The patient was placed in modified dorsal lithotomy position and sterilely prepped and draped in a standard fashion. A timeout occurred. Two patient identifiers were used. We entered the urethra with a 22 [place] scope. We focused our attention on the right ureteral orifice. The ureteral orifice was visualized, and a guidewire was passed into the kidney under fluoroscopic guidance. Retrograde pyelogram demonstrated a 3 cm segment of very narrowed ureter with severe hydronephrosis upstream.    The rigid ureteroscope was assembled, placed next to the Glidewire, and advanced into the ureter carefully. A wire remained in place as a safety. There was extrinsic compression from a mass outside of the ureter. There was no ureteral mass.      We surveyed the ureter. There were no papillary lesions, stricture, ureteral trauma, or papillary lesions. We could not pass  our scope beyond the strictured area but we could visualize the narrowed lumen and no mass was noted intrinsically. A stent was then placed. the stent was advanced until it was in proper location. The Glidewire was then removed. A curl could be seen in the Right renal pelvis under using fluoroscopic vision, and in the bladder under direct visualization. A string was NOT left on the stent. The patients bladder was drained. All instrumentation was removed. The patient was then awakened and discharged back to the PACU in good and stable condition.

## 2023-01-31 LAB — CA 125: 16

## 2023-02-03 ENCOUNTER — TELEPHONE (OUTPATIENT)
Dept: UROLOGY | Age: 68
End: 2023-02-03

## 2023-02-03 DIAGNOSIS — N13.30 HYDRONEPHROSIS OF RIGHT KIDNEY: Primary | ICD-10-CM

## 2023-02-03 DIAGNOSIS — N85.8 UTERINE MASS: ICD-10-CM

## 2023-02-03 DIAGNOSIS — Z01.818 PREOP TESTING: ICD-10-CM

## 2023-02-03 NOTE — TELEPHONE ENCOUNTER
DO NOT TAKE FISH OIL, IBUPROFEN, MOTRIN-LIKE DRUGS AND ANY MULTIVITAMINS OR OVER THE COUNTER SUPPLEMENTS 14 DAYS PRIOR TO SURGERY. HOLD ASPIRIN FOR 5 DAYS PRIOR TO SURGERY. MUST HAVE AN ADULT OVER THE AGE OF 18 WITH YOU AT THE TIME OF THE PROCEDURE AND WITH YOU AT HOME AFTER THE PROCEDURE FOR 24 HOURS       Zoe Parks 1955 Diagnosis:     Surgical Physician: Dr. Miguelina Reyes have been scheduled for the procedure marked below:      Surgery: Cystoscopy, Right stent exchange         Date: 4/4/2023     Anesthesia: Anesthesiologist (General/Spinal)     Place of Service: [facility] Second Floor Same Day Surgery         Arrive to same day surgery by:  10:00am  (Surgery time is subject to change)      INSTRUCTIONS AS MARKED BELOW:    1.  DO NOT eat or drink anything after midnight before surgery. 2.  We prefer you shower or bathe with an antibacterial soap (Dial) the morning of surgery. 3  Please bring a current medication list, photo ID and insurance card(s) with you  4. Okay to take Tylenol  5. If you take Glucophage, Metformin or Janumet, hold 48-hours prior to surgery  6. Take blood pressure or heart medication as directed, if taken in the morning take with a small sip of water  7. The office will call you in 1-2 days after your procedure to schedule a follow up. DATE SENSITIVE TESTING-DO ON THE DATE LISTED*WALK IN *(OUTPATIENT [facility] TESTING IN [facility]) NO APPOINTMENT    DO URINE CULTURE AND FASTING LABS ON 3/21/2023. ORDERS INCLUDED.         Date: 2/3/2023

## 2023-02-03 NOTE — TELEPHONE ENCOUNTER
SURGERY [address] [place] [address] [address] [facility] [address] ROBIN MC AM OFFENEGG II.LUIS, [address]      Phone *[phone] *[phone]   Surgical Scheduling Direct Line Phone *[phone] Fax *[phone]      May Code 1955 female    [address] [facility]   Marital Status:           Home Phone: [phone]      Cell Phone:    Telephone Information:   Mobile [phone]          Surgeon: Dr. Francisco Mcfadden Surgery Date: 4/4/2023   Time: 12:00pm    Procedure: Cystoscopy, Right stent exchange    Diagnosis: hydronephrosis of right kidney     Important Medical History:  In Epic    Special Inst/Equip:     CPT Codes:    44232  Latex Allergy: No     Cardiac Device:  No    Anesthesia:  General          Admission Type:  Same Day                        Admit Prior to Day of Surgery: No    Case Location:  Main OR            Preadmission Testing:  Phone Call          PAT Date and Time:______________________________________________________    PAT Confirmation #: ______________________________________________________    Post Op Visit: ___________________________________________________________    Need Preop Cardiac Clearance: No    Does Patient have Cardiologist/physician?     none    Surgery Confirmation #: __________________________________________________    : ________________________   Date: __________________________     [employer] Name: Kavon Rascon

## 2023-02-03 NOTE — TELEPHONE ENCOUNTER
Patient scheduled with Dr. Jarret Null on 4/4/23. Surgery consent to be done upon arrival.  Patient to do urine culture and fasting labs on 3/21/23; orders mailed to patient. Surgery instructions mailed to patient. Patient will have an adult over the age of 25 with them at discharge and 24 hours after procedure.

## 2023-02-07 ENCOUNTER — OFFICE VISIT (OUTPATIENT)
Dept: UROLOGY | Age: 68
End: 2023-02-07
Payer: MEDICARE

## 2023-02-07 VITALS
WEIGHT: 173.8 LBS | DIASTOLIC BLOOD PRESSURE: 70 MMHG | SYSTOLIC BLOOD PRESSURE: 124 MMHG | BODY MASS INDEX: 29.67 KG/M2 | HEIGHT: 64 IN

## 2023-02-07 DIAGNOSIS — R31.29 MICROHEMATURIA: ICD-10-CM

## 2023-02-07 DIAGNOSIS — N13.30 HYDRONEPHROSIS OF RIGHT KIDNEY: Primary | ICD-10-CM

## 2023-02-07 LAB
BILIRUBIN URINE: NEGATIVE
BLOOD URINE, POC: ABNORMAL
CHARACTER, URINE: CLEAR
COLOR, URINE: YELLOW
GLUCOSE URINE: NEGATIVE MG/DL
KETONES, URINE: NEGATIVE
LEUKOCYTE CLUMPS, URINE: ABNORMAL
NITRITE, URINE: NEGATIVE
PH, URINE: 6.5 (ref 5–9)
PROTEIN, URINE: NEGATIVE MG/DL
SPECIFIC GRAVITY, URINE: 1.01 (ref 1–1.03)
UROBILINOGEN, URINE: 0.2 EU/DL (ref 0–1)

## 2023-02-07 PROCEDURE — 3074F SYST BP LT 130 MM HG: CPT

## 2023-02-07 PROCEDURE — 81003 URINALYSIS AUTO W/O SCOPE: CPT

## 2023-02-07 PROCEDURE — 1123F ACP DISCUSS/DSCN MKR DOCD: CPT

## 2023-02-07 PROCEDURE — 99214 OFFICE O/P EST MOD 30 MIN: CPT

## 2023-02-07 PROCEDURE — 3078F DIAST BP <80 MM HG: CPT

## 2023-02-07 NOTE — PROGRESS NOTES
Patient:  Kathi Awad  YOB: 1955  Date: 2/7/2023    HISTORY OF PRESENT ILLNESS:   The patient is a 79 y.o. female who presents today for evaluation of the following problems: Pelvic Mass    2/7/23   The patient is s/p cystoscopy, right ureteroscopy, stent placement, retrograde pyelogram secondary to right hydronephrosis and possible pelvic mass on 1/18/2023 with Dr. Rosemary Ortega. A string was not left on the stent. Per Dr. Babak Patterson operative report, right hydronephrosis is due to extrinsic compression. No ureteral mass noted. 3 cm segment pelvic inlet obstructed. Patient will need a stent until this can be addressed. Urology has requested that she have  her appt with Gyn moved up. She will have a cystoscopy right stent exchange on 4/4/2023 with Dr. Rosemary Ortega     Overall the problem(s) : show no change. No fevers or chills. No nausea or vomiting. UA: moderate blood, small leukocytes  Pain Scale 0- denies at this time    Will be following up with GYN on the 17th. No concerns with stent at this time. 1/17/23   -The patient presented to the ED on 1/15/2023 with concerns of abdominal pain that has been ongoing since Isa  -UA with mirco: small blood, small leukocytes, 3-5 RBC, 2-4 WBC   -WBC: 11.3   -Hemoglobin: 14.3  -Creatinine: 1.0  -CT: 1. Decreased cortical enhancement of the right kidney, right-sided hydronephrosis and hydroureter, likely secondary to a heterogeneous mass in the right distal ureter. This finding is highly suspicious for malignancy. 2. Exophytic lesion at the right uterus of indeterminate etiology, possibly a leiomyoma. Malignancy cannot be excluded. Pelvic MRI may be helpful for further evaluation. 3. Hypoattenuating lesion in the lower left pelvis, possibly in ovarian or paraovarian cyst. 4. Pancolonic diverticulosis. Overall the problem(s) : are worsening. No fevers or chills. No nausea or vomiting.   Significant pain and poor appetite   Uterine mass as well, has appointment with GYN 2/10   Patient very anxious regarding results    Urinalysis today:  No results found for this visit on 02/07/23. Last BUN and creatinine:  Lab Results   Component Value Date    BUN 20 01/15/2023     Lab Results   Component Value Date    CREATININE 1.0 01/15/2023       PAST MEDICAL, FAMILY AND SOCIAL HISTORY UPDATE:  Past Medical History:   Diagnosis Date    Herpes zoster 11/12    Hyperlipidemia     Hypertension      Past Surgical History:   Procedure Laterality Date    CYSTOSCOPY Right 1/18/2023    Cystoscopy Right Ureteroscopy, Stent Placement retrograde pyelogram performed by Karen Gonzales MD at [facility]       Family History   Problem Relation Age of Onset    Diabetes Mother     High Blood Pressure Mother     Vision Loss Mother     Arthritis Neg Hx     Asthma Neg Hx     Birth Defects Neg Hx     Cancer Neg Hx     Depression Neg Hx     Early Death Neg Hx     Hearing Loss Neg Hx     Heart Disease Neg Hx     High Cholesterol Neg Hx     Learning Disabilities Neg Hx     Kidney Disease Neg Hx     Mental Illness Neg Hx     Mental Retardation Neg Hx     Miscarriages / Stillbirths Neg Hx     Stroke Neg Hx     Substance Abuse Neg Hx     Other Neg Hx      No outpatient medications have been marked as taking for the 2/7/23 encounter (Appointment) with Riana Meade PA-C.        Pcn [penicillins]  Social History     Tobacco Use   Smoking Status Never   Smokeless Tobacco Never       Social History     Substance and Sexual Activity   Alcohol Use Not Currently    Comment: socially       REVIEW OF SYSTEMS:  Constitutional: negative  Eyes: negative  Respiratory: negative  Cardiovascular: negative  Gastrointestinal: negative  Genitourinary: negative except for what is in HPI  Musculoskeletal: negative  Skin: negative   Neurological: negative  Hematological/Lymphatic: negative  Psychological: negative    Physical Exam:    There were no vitals filed for this visit. Patient is a 79 y.o. female in no acute distress and alert and oriented to person, place and time. NAD, Alert  Non labored respiration  Normal peripheral pulses  Soft, non tender  Skin-warm and dry  Psych- normal mood and affect    Assessment and Plan   Mass in/near right distal ureter- suspicious for malignancy   Right hydronephrosis and hydroureter  -The patient is s/p cystoscopy, right ureteroscopy, stent placement, retrograde pyelogram secondary to right hydronephrosis and possible pelvic mass on 1/18/2023 with Dr. Yamileth Rodriguez.   3. Exophytic lesion at the right lesion of the right uterus- malignancy cannot be excluded  -Patient to follow up with GYN for procedure. MRI with several uterine fibroids. 4. Hypo attenuating lesion in the lower left pelvis   -possibly ovarian or paraovarian cyst  5.  Possible acute cystitis  -will send urine for culture                  Nick Higginbotham PA-C   Urology

## 2023-02-09 LAB
BACTERIA UR CULT: ABNORMAL
ORGANISM: ABNORMAL

## 2023-03-15 ENCOUNTER — TELEPHONE (OUTPATIENT)
Dept: UROLOGY | Age: 68
End: 2023-03-15

## 2023-03-15 RX ORDER — SODIUM CHLORIDE 9 MG/ML
INJECTION, SOLUTION INTRAVENOUS PRN
OUTPATIENT
Start: 2023-03-15

## 2023-03-15 RX ORDER — SODIUM CHLORIDE 0.9 % (FLUSH) 0.9 %
5-40 SYRINGE (ML) INJECTION PRN
OUTPATIENT
Start: 2023-03-15

## 2023-03-15 RX ORDER — SODIUM CHLORIDE 0.9 % (FLUSH) 0.9 %
5-40 SYRINGE (ML) INJECTION EVERY 12 HOURS SCHEDULED
OUTPATIENT
Start: 2023-03-15

## 2023-03-15 NOTE — TELEPHONE ENCOUNTER
Patient had a hysterectomy yesterday and scheduled to have a Cystoscopy Right Stent Exchange on 04/04/2023. Is she ok to have that procedure or does it need pushed out since she just had surgery yesterday?

## 2023-04-07 LAB
BASOPHILS ABSOLUTE: 0.1 /ΜL
BASOPHILS RELATIVE PERCENT: 0.7 %
BILIRUBIN, URINE: NEGATIVE
BLOOD, URINE: POSITIVE
BUN BLDV-MCNC: 18 MG/DL
CALCIUM SERPL-MCNC: 10.5 MG/DL
CHLORIDE BLD-SCNC: 100 MMOL/L
CLARITY: CLEAR
CO2: 27 MMOL/L
COLOR: YELLOW
CREAT SERPL-MCNC: 0.6 MG/DL
EGFR: >=90
EOSINOPHILS ABSOLUTE: 0.1 /ΜL
EOSINOPHILS RELATIVE PERCENT: 1.5 %
GLUCOSE BLD-MCNC: 107 MG/DL
GLUCOSE URINE: NORMAL
HCT VFR BLD CALC: 39.3 % (ref 36–46)
HEMOGLOBIN: 12.8 G/DL (ref 12–16)
KETONES, URINE: NEGATIVE
LEUKOCYTE ESTERASE, URINE: NORMAL
LYMPHOCYTES ABSOLUTE: 3 /ΜL
LYMPHOCYTES RELATIVE PERCENT: 36.5 %
MCH RBC QN AUTO: 30 PG
MCHC RBC AUTO-ENTMCNC: 32.6 G/DL
MCV RBC AUTO: 92.3 FL
MONOCYTES ABSOLUTE: 0.6 /ΜL
MONOCYTES RELATIVE PERCENT: 6.8 %
NEUTROPHILS ABSOLUTE: 4.5 /ΜL
NEUTROPHILS RELATIVE PERCENT: 54.3 %
NITRITE, URINE: NEGATIVE
PH UA: 6.5 (ref 4.5–8)
PLATELET # BLD: 452 K/ΜL
PMV BLD AUTO: 9.4 FL
POTASSIUM SERPL-SCNC: 5 MMOL/L
PROTEIN UA: NEGATIVE
RBC # BLD: 4.26 10^6/ΜL
SODIUM BLD-SCNC: 138 MMOL/L
SPECIFIC GRAVITY, URINE: 1.01
UROBILINOGEN, URINE: NORMAL
WBC # BLD: 8.2 10^3/ML

## 2023-04-15 ENCOUNTER — HOSPITAL ENCOUNTER (OUTPATIENT)
Age: 68
Setting detail: OUTPATIENT SURGERY
Discharge: HOME | End: 2023-04-15
Attending: UROLOGY | Admitting: UROLOGY
Payer: MEDICARE

## 2023-04-15 VITALS
TEMPERATURE: 97.1 F | OXYGEN SATURATION: 99 % | DIASTOLIC BLOOD PRESSURE: 69 MMHG | SYSTOLIC BLOOD PRESSURE: 144 MMHG | BODY MASS INDEX: 28.44 KG/M2 | RESPIRATION RATE: 20 BRPM | WEIGHT: 166.6 LBS | HEIGHT: 64 IN | HEART RATE: 56 BPM

## 2023-04-15 PROCEDURE — 7100000010 HC PHASE II RECOVERY - FIRST 15 MIN: Performed by: UROLOGY

## 2023-04-15 PROCEDURE — C2617 STENT, NON-COR, TEM W/O DEL: HCPCS | Performed by: UROLOGY

## 2023-04-15 PROCEDURE — C1769 GUIDE WIRE: HCPCS | Performed by: UROLOGY

## 2023-04-15 PROCEDURE — 3600000012 HC SURGERY LEVEL 2 ADDTL 15MIN: Performed by: UROLOGY

## 2023-04-15 PROCEDURE — 3600000002 HC SURGERY LEVEL 2 BASE: Performed by: UROLOGY

## 2023-04-15 PROCEDURE — 7100000011 HC PHASE II RECOVERY - ADDTL 15 MIN: Performed by: UROLOGY

## 2023-04-15 PROCEDURE — 3700000001 HC ADD 15 MINUTES (ANESTHESIA): Performed by: UROLOGY

## 2023-04-15 PROCEDURE — 3700000000 HC ANESTHESIA ATTENDED CARE: Performed by: UROLOGY

## 2023-04-15 PROCEDURE — C1758 CATHETER, URETERAL: HCPCS | Performed by: UROLOGY

## 2023-04-15 PROCEDURE — 2580000003 HC RX 258: Performed by: UROLOGY

## 2023-04-15 PROCEDURE — 2709999900 HC NON-CHARGEABLE SUPPLY: Performed by: UROLOGY

## 2023-04-15 DEVICE — URETERAL STENT
Type: IMPLANTABLE DEVICE | Status: FUNCTIONAL
Brand: PERCUFLEX™ PLUS

## 2023-04-15 RX ORDER — PHENAZOPYRIDINE HYDROCHLORIDE 200 MG/1
200 TABLET, FILM COATED ORAL 3 TIMES DAILY PRN
Qty: 9 TABLET | Refills: 1 | Status: SHIPPED | OUTPATIENT
Start: 2023-04-15 | End: 2023-04-18

## 2023-04-15 RX ORDER — SODIUM CHLORIDE 9 MG/ML
INJECTION, SOLUTION INTRAVENOUS PRN
Status: DISCONTINUED | OUTPATIENT
Start: 2023-04-15 | End: 2023-04-15 | Stop reason: HOSPADM

## 2023-04-15 RX ORDER — SODIUM CHLORIDE 0.9 % (FLUSH) 0.9 %
5-40 SYRINGE (ML) INJECTION PRN
Status: DISCONTINUED | OUTPATIENT
Start: 2023-04-15 | End: 2023-04-15 | Stop reason: HOSPADM

## 2023-04-15 RX ORDER — SODIUM CHLORIDE 0.9 % (FLUSH) 0.9 %
5-40 SYRINGE (ML) INJECTION EVERY 12 HOURS SCHEDULED
Status: DISCONTINUED | OUTPATIENT
Start: 2023-04-15 | End: 2023-04-15 | Stop reason: HOSPADM

## 2023-04-15 RX ADMIN — SODIUM CHLORIDE: 9 INJECTION, SOLUTION INTRAVENOUS at 09:55

## 2023-04-15 ASSESSMENT — PAIN - FUNCTIONAL ASSESSMENT: PAIN_FUNCTIONAL_ASSESSMENT: 0-10

## 2023-04-15 NOTE — PROGRESS NOTES
Pt returned to [facility] room 12. Vitals and assessment as charted. 0.9 infusing from PACU. Pt has crackers and water. Family at the bedside. Pt and family verbalized understanding of discharge criteria and call light use. Call light in reach.

## 2023-04-15 NOTE — H&P
negative    Physical Exam:      No data found. Constitutional: Patient in no acute distress; Neuro: alert and oriented to person place and time. Psych: Mood and affect normal.  Skin: Normal  Lungs: Respiratory effort normal, CTA  Cardiovascular:  Normal peripheral pulses; no murmur. Normal rhythm  Abdomen: Soft, non-tender, non-distended with no CVA, flank pain, hepatosplenomegaly or hernia. Kidneys normal.  Bladder non-tender and not distended. LABS:   No results for input(s): WBC, HGB, HCT, MCV, PLT in the last 72 hours. No results for input(s): NA, K, CL, CO2, PHOS, BUN, CREATININE, CA in the last 72 hours. No results found for: PSA      Urinalysis: No results for input(s): COLORU, PHUR, LABCAST, WBCUA, RBCUA, MUCUS, TRICHOMONAS, YEAST, BACTERIA, CLARITYU, SPECGRAV, LEUKOCYTESUR, UROBILINOGEN, [place] in the last 72 hours.     Invalid input(s): NITRATE, GLUCOSEUKETONESUAMORPHOUS     -----------------------------------------------------------------      Assessment and Plan     Impression:    Patient Active Problem List   Diagnosis    Hyperlipidemia    Insomnia    Essential hypertension    Elevated AST (SGOT)    IFG (impaired fasting glucose)       Plan:     Consent obtained; right stent exchnage in OR today    Dahlia Harkins MD  8:42 AM 4/15/2023

## 2023-04-15 NOTE — PROGRESS NOTES
1200  report taken from [facility]. Pt. Awake and oriented. Pt. Denies pain. 1230  pt. Ambulated to bathroom and voided without difficulty. [phone]  discharge instructions given. Pt. Ander dos santos. 1300  Phase II criteria met. Pt. Discharged per wheelchair. Pt. Stable and tolerated well.

## 2023-04-15 NOTE — DISCHARGE INSTRUCTIONS
Pt ok to discharge home in good condition  No heavy lifting, >10 lbs for today  Pt should avoid strenuous activity for today  Pt should walk moderately at home  Pt ok to shower   Pt may resume diet as tolerated  Pt should take Rx as directed  No driving while on narcotics  Please call attending physician or hospital  with questions  Call or Present to ED if fever (> 101F), intractable nausea vomiting or pain.   Rx in chart    Pt should follow up with Vahe Maynard MD, in 6 weeks, call to confirm appointment

## 2023-04-15 NOTE — BRIEF OP NOTE
Brief Postoperative Note      Patient: oTmasz Garg  YOB: 1955  MRN: [id number]    Date of Procedure: 4/15/2023    Pre-Op Diagnosis Codes: * Hydronephrosis of right kidney [N13.30]    Post-Op Diagnosis: Same       Procedure(s):  Cystoscopy Right Stent Exchange    Surgeon(s):  Dominga Rascon MD    Assistant:  * No surgical staff found *    Anesthesia: General    Estimated Blood Loss (mL): Minimal    Complications: None    Specimens:   * No specimens in log *    Implants:  Implant Name Type Inv. Item Serial No.  Lot No. LRB No. Used Action   STENT URET 6FR L26CM HYDR+ PGTL Elvis Eduardo LO - [id number]  [facility] 6FR L26CM HYDR+ PGTL TAPR TIP GRAD BLDR MRK LO  [employer] [facility] UROLOGY-WD [id number] Right 1 Implanted         Drains: * No LDAs found *    Findings: pt had AVIVA. Ureter did drain on retrograde but very stenotic. Will have pt pull stent in 5 days.  Renal u/s and lasix renogram in 6 weeks with [place]      Electronically signed by José Manuel Doyle MD on 4/15/2023 at 11:20 AM

## 2023-04-17 ENCOUNTER — TELEPHONE (OUTPATIENT)
Dept: FAMILY MEDICINE CLINIC | Age: 68
End: 2023-04-17

## 2023-04-17 DIAGNOSIS — N85.8 UTERINE MASS: ICD-10-CM

## 2023-04-17 DIAGNOSIS — R31.29 MICROHEMATURIA: ICD-10-CM

## 2023-04-17 DIAGNOSIS — N13.30 HYDRONEPHROSIS OF RIGHT KIDNEY: Primary | ICD-10-CM

## 2023-04-17 NOTE — OP NOTE
a distal curl was noted in the bladder under direct visualization. We did leave a tring          The patients bladder was drained. All instrumentation was removed. The patient was then awakened and discharged back to the PACU in good and stable condition. Pull stent in five days.  Ultrasound/lasix renogram in 4-6 weeks

## 2023-04-18 NOTE — TELEPHONE ENCOUNTER
Care Transitions Initial Follow Up Call    Call within 2 business days of discharge: Yes     Patient: Jaylyn Oscar Patient : 1955 MRN: [id number]    [unfilled]    RARS: No data recorded     Spoke with: patient     Discharge department/facility: [facility]    Non-face-to-face services provided:  Scheduled appointment with Specialist-     Follow Up  Future Appointments   Date Time Provider Dafne Vallejo   2023 12:30 PM STR ULTRASOUND RM 2 STRZ US STR Radiolog   2023  1:00 PM STR NUCLEAR MEDICINE RM3 GE INFINIA 1 STRZ NUC MED STR Radiolog   2023  9:15 AM Jany Canales, [address] [place], CMA ([facility])

## 2023-04-24 ENCOUNTER — NURSE ONLY (OUTPATIENT)
Dept: LAB | Age: 68
End: 2023-04-24

## 2023-04-24 ENCOUNTER — TELEPHONE (OUTPATIENT)
Dept: UROLOGY | Age: 68
End: 2023-04-24

## 2023-04-24 DIAGNOSIS — R82.90 FOUL SMELLING URINE: ICD-10-CM

## 2023-04-24 DIAGNOSIS — R30.0 DYSURIA: ICD-10-CM

## 2023-04-24 DIAGNOSIS — R82.90 FOUL SMELLING URINE: Primary | ICD-10-CM

## 2023-04-24 LAB
BACTERIA: ABNORMAL
BILIRUB UR QL STRIP: NEGATIVE
CASTS #/AREA URNS LPF: ABNORMAL /LPF
CASTS #/AREA URNS LPF: ABNORMAL /LPF
CHARACTER UR: ABNORMAL
CHARCOAL URNS QL MICRO: ABNORMAL
COLOR UR: YELLOW
CRYSTALS URNS QL MICRO: ABNORMAL
EPITHELIAL CELLS, UA: ABNORMAL /HPF
GLUCOSE UR QL STRIP.AUTO: NEGATIVE MG/DL
HGB UR QL STRIP.AUTO: ABNORMAL
KETONES UR QL STRIP.AUTO: NEGATIVE
LEUKOCYTE ESTERASE UR QL STRIP.AUTO: ABNORMAL
NITRITE UR QL STRIP.AUTO: NEGATIVE
PH UR STRIP.AUTO: 5.5 [PH] (ref 5–9)
PROT UR STRIP.AUTO-MCNC: 30 MG/DL
RBC #/AREA URNS HPF: ABNORMAL /HPF
RENAL EPI CELLS #/AREA URNS HPF: ABNORMAL /[HPF]
SPECIFIC GRAVITY UA: 1.02 (ref 1–1.03)
UROBILINOGEN, URINE: 1 EU/DL (ref 0–1)
WBC #/AREA URNS HPF: > 100 /HPF
YEAST LIKE FUNGI URNS QL MICRO: ABNORMAL

## 2023-04-24 RX ORDER — NITROFURANTOIN 25; 75 MG/1; MG/1
100 CAPSULE ORAL 2 TIMES DAILY
Qty: 20 CAPSULE | Refills: 0 | Status: SHIPPED | OUTPATIENT
Start: 2023-04-24 | End: 2023-05-04

## 2023-04-24 NOTE — TELEPHONE ENCOUNTER
Patient underwent cystoscopy, right ureteral stent exchange on 04/15/2023. After patient pulled the stent out last Thursday, she got sick with nausea, foul smelling urine, and after she wipes she sees a mustard color discharge. She thinks she may have infection. Her fever has resolved. The last elevated temperature was Saturday night or Sunday morning. It did range from 98 to 102. Continues to have headache and right sided neck pain, urgency, and frequency. She denies burning. She prefers not have flagyl. She was on clindamycin in the past and did ok with that. Orders placed. She would like to be started on antibiotics while waiting on results.

## 2023-04-25 LAB
BACTERIA UR CULT: ABNORMAL
ORGANISM: ABNORMAL

## 2023-04-26 NOTE — TELEPHONE ENCOUNTER
Patient advised of the urine results and to continue the macrobid until completed. She voiced understanding. The symptoms have improved.

## 2023-05-15 DIAGNOSIS — N13.30 HYDRONEPHROSIS OF RIGHT KIDNEY: Primary | ICD-10-CM

## 2023-05-23 ENCOUNTER — APPOINTMENT (OUTPATIENT)
Dept: NUCLEAR MEDICINE | Age: 68
End: 2023-05-23
Payer: MEDICARE

## 2023-05-23 ENCOUNTER — HOSPITAL ENCOUNTER (OUTPATIENT)
Dept: ULTRASOUND IMAGING | Age: 68
Discharge: HOME | End: 2023-05-23
Payer: MEDICARE

## 2023-05-23 DIAGNOSIS — N85.8 UTERINE MASS: ICD-10-CM

## 2023-05-23 DIAGNOSIS — R31.29 MICROHEMATURIA: ICD-10-CM

## 2023-05-23 DIAGNOSIS — N13.30 HYDRONEPHROSIS OF RIGHT KIDNEY: ICD-10-CM

## 2023-05-23 PROCEDURE — 76770 US EXAM ABDO BACK WALL COMP: CPT

## 2023-06-01 NOTE — PROGRESS NOTES
and color images were obtained. COMPARISON: None FINDINGS: The right kidney measures 12.4 x 5.7 x 4.9 cm and the left kidney measures 12.2 x 4.5 x 5.2 cm. Renal cortical thickness is normal bilaterally. An echogenic structure at the lower left kidney measures 0.6 cm. An anechoic structure at the lower left kidney measures 0.8 cm. There is no hydronephrosis. Color Doppler demonstrates expected wave forms in the bilateral renal arteries. Arcuate resistive indices are normal bilaterally. The distended urinary bladder is unremarkable. There is a small amount of postvoid residual urine in the bladder. 1. Probable left renal cyst. 2. Left-sided nephrolithiasis. 3. Small amount of postvoid residual urine but otherwise unremarkable urinary bladder. Final report electronically signed by Dr. Vivienne Baxter on 5/24/2023 7:46 AM      Urinalysis today:  No results found for this visit on 06/02/23.     Last BUN and creatinine:  Lab Results   Component Value Date    BUN 18 04/07/2023     Lab Results   Component Value Date    CREATININE 0.6 04/07/2023       PAST MEDICAL, FAMILY AND SOCIAL HISTORY UPDATE:  Past Medical History:   Diagnosis Date    Herpes zoster 11/01/2012    Hyperlipidemia     Hypertension      Past Surgical History:   Procedure Laterality Date    CYSTOSCOPY Right 01/18/2023    Cystoscopy Right Ureteroscopy, Stent Placement retrograde pyelogram performed by Ramona Interiano MD at [place] [address] Right 4/15/2023    Cystoscopy Right Stent Exchange performed by Ramona Interiano MD at [address] [place] [address] (CERVIX STATUS UNKNOWN)  03/2014    total    TOOTH EXTRACTION       Family History   Problem Relation Age of Onset    Diabetes Mother     High Blood Pressure Mother     Vision Loss Mother     Arthritis Neg Hx     Asthma Neg Hx     Birth Defects Neg Hx     Cancer Neg Hx     Depression Neg Hx     Early Death Neg Hx     Hearing Loss Neg Hx     Heart Disease Neg Hx     High Cholesterol

## 2023-06-02 ENCOUNTER — OFFICE VISIT (OUTPATIENT)
Dept: UROLOGY | Age: 68
End: 2023-06-02
Payer: MEDICARE

## 2023-06-02 VITALS — WEIGHT: 166 LBS | HEIGHT: 64 IN | RESPIRATION RATE: 16 BRPM | BODY MASS INDEX: 28.34 KG/M2

## 2023-06-02 DIAGNOSIS — N39.0 URINARY TRACT INFECTION WITHOUT HEMATURIA, SITE UNSPECIFIED: ICD-10-CM

## 2023-06-02 DIAGNOSIS — N13.30 HYDRONEPHROSIS OF RIGHT KIDNEY: Primary | ICD-10-CM

## 2023-06-02 DIAGNOSIS — R31.29 MICROHEMATURIA: ICD-10-CM

## 2023-06-02 LAB
BACTERIA: ABNORMAL
BILIRUB UR QL STRIP: NEGATIVE
BILIRUBIN URINE: NEGATIVE
BLOOD URINE, POC: NORMAL
CASTS #/AREA URNS LPF: ABNORMAL /LPF
CASTS #/AREA URNS LPF: ABNORMAL /LPF
CHARACTER UR: CLEAR
CHARACTER, URINE: CLEAR
CHARCOAL URNS QL MICRO: ABNORMAL
COLOR UR: YELLOW
COLOR, URINE: YELLOW
CRYSTALS URNS QL MICRO: ABNORMAL
EPITHELIAL CELLS, UA: ABNORMAL /HPF
GLUCOSE UR QL STRIP.AUTO: NEGATIVE MG/DL
GLUCOSE URINE: NEGATIVE MG/DL
HGB UR QL STRIP.AUTO: NEGATIVE
KETONES UR QL STRIP.AUTO: NEGATIVE
KETONES, URINE: NEGATIVE
LEUKOCYTE CLUMPS, URINE: NEGATIVE
LEUKOCYTE ESTERASE UR QL STRIP.AUTO: ABNORMAL
NITRITE UR QL STRIP.AUTO: NEGATIVE
NITRITE, URINE: NEGATIVE
PH UR STRIP.AUTO: 5.5 [PH] (ref 5–9)
PH, URINE: 5 (ref 5–9)
PROT UR STRIP.AUTO-MCNC: NEGATIVE MG/DL
PROTEIN, URINE: NEGATIVE MG/DL
RBC #/AREA URNS HPF: ABNORMAL /HPF
RENAL EPI CELLS #/AREA URNS HPF: ABNORMAL /[HPF]
SPECIFIC GRAVITY UA: 1.02 (ref 1–1.03)
SPECIFIC GRAVITY, URINE: 1.02 (ref 1–1.03)
UROBILINOGEN, URINE: 0.2 EU/DL (ref 0–1)
UROBILINOGEN, URINE: 0.2 EU/DL (ref 0–1)
WBC #/AREA URNS HPF: ABNORMAL /HPF
YEAST LIKE FUNGI URNS QL MICRO: ABNORMAL

## 2023-06-02 PROCEDURE — 99214 OFFICE O/P EST MOD 30 MIN: CPT

## 2023-06-02 PROCEDURE — 1123F ACP DISCUSS/DSCN MKR DOCD: CPT

## 2023-06-02 PROCEDURE — 81003 URINALYSIS AUTO W/O SCOPE: CPT

## 2023-06-02 NOTE — PATIENT INSTRUCTIONS
Recommend lifestyle changes including timed voiding, double voiding. Avoid constipation, recommend bowel regimen. Get renal lasix scan. Follow-up with the results. Call with questions, comments, or concerns. I recommend going to the ED for further evaluation if you develop fever, chills, nausea, vomiting, chest pain, SOB, or calf pain.

## 2023-07-11 ENCOUNTER — HOSPITAL ENCOUNTER (OUTPATIENT)
Dept: NUCLEAR MEDICINE | Age: 68
Discharge: HOME | End: 2023-07-11
Attending: UROLOGY
Payer: MEDICARE

## 2023-07-11 DIAGNOSIS — N85.8 UTERINE MASS: ICD-10-CM

## 2023-07-11 DIAGNOSIS — N13.30 HYDRONEPHROSIS OF RIGHT KIDNEY: ICD-10-CM

## 2023-07-11 DIAGNOSIS — R31.29 MICROHEMATURIA: ICD-10-CM

## 2023-07-11 LAB — POC CREATININE WHOLE BLOOD: 0.8 MG/DL (ref 0.5–1.2)

## 2023-07-11 PROCEDURE — 3430000000 HC RX DIAGNOSTIC RADIOPHARMACEUTICAL: Performed by: UROLOGY

## 2023-07-11 PROCEDURE — 6360000002 HC RX W HCPCS: Performed by: RADIOLOGY

## 2023-07-11 PROCEDURE — A9562 TC99M MERTIATIDE: HCPCS | Performed by: UROLOGY

## 2023-07-11 PROCEDURE — 82565 ASSAY OF CREATININE: CPT

## 2023-07-11 PROCEDURE — 78708 K FLOW/FUNCT IMAGE W/DRUG: CPT

## 2023-07-11 RX ORDER — FUROSEMIDE 10 MG/ML
40 INJECTION INTRAMUSCULAR; INTRAVENOUS ONCE
Status: COMPLETED | OUTPATIENT
Start: 2023-07-11 | End: 2023-07-11

## 2023-07-11 RX ADMIN — FUROSEMIDE 40 MG: 10 INJECTION, SOLUTION INTRAMUSCULAR; INTRAVENOUS at 13:34

## 2023-07-11 RX ADMIN — Medication 9.7 MILLICURIE: at 13:24

## 2023-07-13 ENCOUNTER — OFFICE VISIT (OUTPATIENT)
Dept: UROLOGY | Age: 68
End: 2023-07-13
Payer: MEDICARE

## 2023-07-13 VITALS
WEIGHT: 170 LBS | SYSTOLIC BLOOD PRESSURE: 124 MMHG | DIASTOLIC BLOOD PRESSURE: 70 MMHG | HEIGHT: 64 IN | BODY MASS INDEX: 29.02 KG/M2

## 2023-07-13 DIAGNOSIS — R31.29 MICROHEMATURIA: ICD-10-CM

## 2023-07-13 DIAGNOSIS — N20.0 LEFT NEPHROLITHIASIS: ICD-10-CM

## 2023-07-13 DIAGNOSIS — N13.30 HYDRONEPHROSIS OF RIGHT KIDNEY: Primary | ICD-10-CM

## 2023-07-13 LAB
BILIRUBIN URINE: NEGATIVE
BLOOD URINE, POC: NEGATIVE
CHARACTER, URINE: CLEAR
COLOR, URINE: YELLOW
GLUCOSE URINE: NEGATIVE MG/DL
KETONES, URINE: NEGATIVE
LEUKOCYTE CLUMPS, URINE: ABNORMAL
NITRITE, URINE: NEGATIVE
PH, URINE: 5.5 (ref 5–9)
PROTEIN, URINE: NEGATIVE MG/DL
SPECIFIC GRAVITY, URINE: >= 1.03 (ref 1–1.03)
UROBILINOGEN, URINE: 0.2 EU/DL (ref 0–1)

## 2023-07-13 PROCEDURE — 3078F DIAST BP <80 MM HG: CPT

## 2023-07-13 PROCEDURE — 99214 OFFICE O/P EST MOD 30 MIN: CPT

## 2023-07-13 PROCEDURE — 1123F ACP DISCUSS/DSCN MKR DOCD: CPT

## 2023-07-13 PROCEDURE — 81003 URINALYSIS AUTO W/O SCOPE: CPT

## 2023-07-13 PROCEDURE — 3074F SYST BP LT 130 MM HG: CPT

## 2023-07-13 RX ORDER — ZOLPIDEM TARTRATE 5 MG/1
5 TABLET ORAL NIGHTLY PRN
COMMUNITY

## 2023-07-13 NOTE — PROGRESS NOTES
UPDATE:  Past Medical History:   Diagnosis Date    Herpes zoster 11/01/2012    Hyperlipidemia     Hypertension      Past Surgical History:   Procedure Laterality Date    CYSTOSCOPY Right 01/18/2023    Cystoscopy Right Ureteroscopy, Stent Placement retrograde pyelogram performed by Nolan Gil MD at [address] [facility] Right 4/15/2023    Cystoscopy Right Stent Exchange performed by Nolan Gil MD at [address] [place] [address] (CERVIX STATUS UNKNOWN)  03/2014    total    TOOTH EXTRACTION       Family History   Problem Relation Age of Onset    Diabetes Mother     High Blood Pressure Mother     Vision Loss Mother     Arthritis Neg Hx     Asthma Neg Hx     Birth Defects Neg Hx     Cancer Neg Hx     Depression Neg Hx     Early Death Neg Hx     Hearing Loss Neg Hx     Heart Disease Neg Hx     High Cholesterol Neg Hx     Learning Disabilities Neg Hx     Kidney Disease Neg Hx     Mental Illness Neg Hx     Mental Retardation Neg Hx     Miscarriages / Stillbirths Neg Hx     Stroke Neg Hx     Substance Abuse Neg Hx     Other Neg Hx      Outpatient Medications Marked as Taking for the 7/13/23 encounter (Office Visit) with Yadi Oscar PA-C   Medication Sig Dispense Refill    zolpidem (AMBIEN) 5 MG tablet Take 1 tablet by mouth nightly as needed for Sleep. Max Daily Amount: 5 mg      lisinopril (PRINIVIL;ZESTRIL) 20 MG tablet Take 1 tablet by mouth in the morning. 90 tablet 3    pravastatin (PRAVACHOL) 40 MG tablet Take 1 tablet by mouth in the morning.  90 tablet 3    aspirin 81 MG tablet Take 1 tablet by mouth daily         Pcn [penicillins]  Social History     Tobacco Use   Smoking Status Never   Smokeless Tobacco Never       Social History     Substance and Sexual Activity   Alcohol Use Not Currently    Comment: socially       REVIEW OF SYSTEMS:  Constitutional: negative  Eyes: negative  Respiratory: negative  Cardiovascular: negative  Gastrointestinal: negative  Genitourinary:

## 2023-07-13 NOTE — PATIENT INSTRUCTIONS
Follow up in 1 year with renal US  Call with questions, comments, or concerns. I recommend going to the ED for further evaluation if you develop fever, chills, nausea, vomiting, chest pain, SOB, or calf pain.

## 2023-07-28 DIAGNOSIS — E78.5 HYPERLIPIDEMIA, UNSPECIFIED HYPERLIPIDEMIA TYPE: ICD-10-CM

## 2023-07-28 DIAGNOSIS — R73.01 IFG (IMPAIRED FASTING GLUCOSE): Primary | ICD-10-CM

## 2023-07-28 DIAGNOSIS — I10 ESSENTIAL HYPERTENSION: Chronic | ICD-10-CM

## 2023-07-28 RX ORDER — LISINOPRIL 20 MG/1
20 TABLET ORAL DAILY
Qty: 30 TABLET | Refills: 0 | Status: SHIPPED | OUTPATIENT
Start: 2023-07-28 | End: 2024-07-28

## 2023-07-28 RX ORDER — PRAVASTATIN SODIUM 40 MG
40 TABLET ORAL DAILY
Qty: 30 TABLET | Refills: 0 | Status: SHIPPED | OUTPATIENT
Start: 2023-07-28

## 2023-07-28 NOTE — TELEPHONE ENCOUNTER
This medication refill is regarding a fax request. Refill requested by  [employer] . Requested Prescriptions     Pending Prescriptions Disp Refills    lisinopril (PRINIVIL;ZESTRIL) 20 MG tablet 30 tablet 0     Sig: Take 1 tablet by mouth daily    pravastatin (PRAVACHOL) 40 MG tablet 30 tablet 0     Sig: Take 1 tablet by mouth daily     Date of last visit: 9/27/2022   Date of next visit: 8/15/2023  Date of last refill: 8/1/22 #90/3 for both    Last Lipid Panel:    Lab Results   Component Value Date/Time    CHOL 192 07/18/2022 12:00 AM    TRIG 150 07/18/2022 12:00 AM    HDL 40 07/18/2022 12:00 AM    LDLCALC 122 07/18/2022 12:00 AM     Last CMP:   Lab Results   Component Value Date     04/07/2023    K 5.0 04/07/2023     04/07/2023    CO2 27 04/07/2023    BUN 18 04/07/2023    CREATININE 0.6 04/07/2023    GLUCOSE 107 04/07/2023    CALCIUM 10.5 04/07/2023    PROT 6.9 01/15/2023    LABALBU 3.3 (L) 01/15/2023    BILITOT 0.5 01/15/2023    ALKPHOS 70 01/15/2023    AST 13 01/15/2023    ALT 10 (L) 01/15/2023    LABGLOM >=90 04/07/2023     Rx verified, ordered and set to EP.

## 2023-07-28 NOTE — TELEPHONE ENCOUNTER
Rx EP'd to pharmacy. Please notify patient. Requested Prescriptions     Signed Prescriptions Disp Refills    lisinopril (PRINIVIL;ZESTRIL) 20 MG tablet 30 tablet 0     Sig: Take 1 tablet by mouth daily     Authorizing Provider: Bonifacio TORRES    pravastatin (PRAVACHOL) 40 MG tablet 30 tablet 0     Sig: Take 1 tablet by mouth daily     Authorizing Provider: Jimmy Andre orders pended.       Electronically signed by Jin Garcia MD on 7/28/2023 at 12:41 PM

## 2023-08-07 LAB
AVERAGE GLUCOSE: 117
CHOLESTEROL, TOTAL: 211 MG/DL
CHOLESTEROL/HDL RATIO: NORMAL
HBA1C MFR BLD: 5.7 %
HDLC SERPL-MCNC: 40 MG/DL (ref 35–70)
LDL CHOLESTEROL CALCULATED: 133 MG/DL (ref 0–160)
NONHDLC SERPL-MCNC: NORMAL MG/DL
TRIGL SERPL-MCNC: 189 MG/DL
VLDLC SERPL CALC-MCNC: 38 MG/DL

## 2023-08-14 SDOH — ECONOMIC STABILITY: INCOME INSECURITY: HOW HARD IS IT FOR YOU TO PAY FOR THE VERY BASICS LIKE FOOD, HOUSING, MEDICAL CARE, AND HEATING?: NOT HARD AT ALL

## 2023-08-14 SDOH — HEALTH STABILITY: PHYSICAL HEALTH: ON AVERAGE, HOW MANY MINUTES DO YOU ENGAGE IN EXERCISE AT THIS LEVEL?: 20 MIN

## 2023-08-14 SDOH — ECONOMIC STABILITY: FOOD INSECURITY: WITHIN THE PAST 12 MONTHS, THE FOOD YOU BOUGHT JUST DIDN'T LAST AND YOU DIDN'T HAVE MONEY TO GET MORE.: NEVER TRUE

## 2023-08-14 SDOH — HEALTH STABILITY: PHYSICAL HEALTH: ON AVERAGE, HOW MANY DAYS PER WEEK DO YOU ENGAGE IN MODERATE TO STRENUOUS EXERCISE (LIKE A BRISK WALK)?: 3 DAYS

## 2023-08-14 SDOH — ECONOMIC STABILITY: FOOD INSECURITY: WITHIN THE PAST 12 MONTHS, YOU WORRIED THAT YOUR FOOD WOULD RUN OUT BEFORE YOU GOT MONEY TO BUY MORE.: NEVER TRUE

## 2023-08-14 SDOH — ECONOMIC STABILITY: HOUSING INSECURITY
IN THE LAST 12 MONTHS, WAS THERE A TIME WHEN YOU DID NOT HAVE A STEADY PLACE TO SLEEP OR SLEPT IN A SHELTER (INCLUDING NOW)?: NO

## 2023-08-14 ASSESSMENT — PATIENT HEALTH QUESTIONNAIRE - PHQ9
SUM OF ALL RESPONSES TO PHQ QUESTIONS 1-9: 0
1. LITTLE INTEREST OR PLEASURE IN DOING THINGS: 0
SUM OF ALL RESPONSES TO PHQ9 QUESTIONS 1 & 2: 0
SUM OF ALL RESPONSES TO PHQ QUESTIONS 1-9: 0
2. FEELING DOWN, DEPRESSED OR HOPELESS: 0

## 2023-08-14 ASSESSMENT — LIFESTYLE VARIABLES
HOW MANY STANDARD DRINKS CONTAINING ALCOHOL DO YOU HAVE ON A TYPICAL DAY: 1
HOW OFTEN DO YOU HAVE A DRINK CONTAINING ALCOHOL: 2
HOW OFTEN DO YOU HAVE A DRINK CONTAINING ALCOHOL: MONTHLY OR LESS
HOW OFTEN DO YOU HAVE SIX OR MORE DRINKS ON ONE OCCASION: 1
HOW MANY STANDARD DRINKS CONTAINING ALCOHOL DO YOU HAVE ON A TYPICAL DAY: 1 OR 2

## 2023-08-15 ENCOUNTER — OFFICE VISIT (OUTPATIENT)
Dept: FAMILY MEDICINE CLINIC | Age: 68
End: 2023-08-15
Payer: MEDICARE

## 2023-08-15 VITALS
HEART RATE: 68 BPM | SYSTOLIC BLOOD PRESSURE: 122 MMHG | DIASTOLIC BLOOD PRESSURE: 82 MMHG | BODY MASS INDEX: 29.12 KG/M2 | TEMPERATURE: 97.9 F | RESPIRATION RATE: 16 BRPM | WEIGHT: 170.6 LBS | HEIGHT: 64 IN

## 2023-08-15 DIAGNOSIS — E78.5 HYPERLIPIDEMIA, UNSPECIFIED HYPERLIPIDEMIA TYPE: ICD-10-CM

## 2023-08-15 DIAGNOSIS — F51.01 PRIMARY INSOMNIA: ICD-10-CM

## 2023-08-15 DIAGNOSIS — I10 ESSENTIAL HYPERTENSION: Chronic | ICD-10-CM

## 2023-08-15 DIAGNOSIS — Z12.31 ENCOUNTER FOR SCREENING MAMMOGRAM FOR MALIGNANT NEOPLASM OF BREAST: ICD-10-CM

## 2023-08-15 DIAGNOSIS — Z00.00 MEDICARE ANNUAL WELLNESS VISIT, SUBSEQUENT: Primary | ICD-10-CM

## 2023-08-15 PROCEDURE — G0439 PPPS, SUBSEQ VISIT: HCPCS | Performed by: FAMILY MEDICINE

## 2023-08-15 PROCEDURE — 3074F SYST BP LT 130 MM HG: CPT | Performed by: FAMILY MEDICINE

## 2023-08-15 PROCEDURE — 1123F ACP DISCUSS/DSCN MKR DOCD: CPT | Performed by: FAMILY MEDICINE

## 2023-08-15 PROCEDURE — 3079F DIAST BP 80-89 MM HG: CPT | Performed by: FAMILY MEDICINE

## 2023-08-15 RX ORDER — LISINOPRIL 20 MG/1
20 TABLET ORAL DAILY
Qty: 90 TABLET | Refills: 3 | Status: CANCELLED | OUTPATIENT
Start: 2023-08-15 | End: 2024-08-15

## 2023-08-15 RX ORDER — LISINOPRIL 20 MG/1
20 TABLET ORAL DAILY
Qty: 30 TABLET | Refills: 0 | Status: CANCELLED | OUTPATIENT
Start: 2023-08-15 | End: 2024-08-15

## 2023-08-15 RX ORDER — PRAVASTATIN SODIUM 40 MG
40 TABLET ORAL DAILY
Qty: 30 TABLET | Refills: 0 | Status: CANCELLED | OUTPATIENT
Start: 2023-08-15

## 2023-08-15 RX ORDER — PRAVASTATIN SODIUM 40 MG
40 TABLET ORAL DAILY
Qty: 90 TABLET | Refills: 3 | Status: CANCELLED | OUTPATIENT
Start: 2023-08-15

## 2023-08-15 RX ORDER — ZOLPIDEM TARTRATE 5 MG/1
5 TABLET ORAL NIGHTLY PRN
Qty: 30 TABLET | Refills: 0 | Status: SHIPPED | OUTPATIENT
Start: 2023-08-15 | End: 2023-09-14

## 2023-08-15 RX ORDER — PRAVASTATIN SODIUM 80 MG/1
80 TABLET ORAL DAILY
COMMUNITY
Start: 2023-08-15

## 2023-08-15 NOTE — PATIENT INSTRUCTIONS

## 2023-08-15 NOTE — PROGRESS NOTES
Medicare Annual Wellness Visit    Severa Rush is here for Medicare AWV and Discuss Labs    Assessment & Plan     1. Medicare annual wellness visit, subsequent  2. Essential hypertension  3. Hyperlipidemia, unspecified hyperlipidemia type  4. Encounter for screening mammogram for malignant neoplasm of breast  -     S[facility]HIE [place] DIGITAL SCREEN BILATERAL; Future  5. Primary insomnia  -     zolpidem (AMBIEN) 5 MG tablet; Take 1 tablet by mouth nightly as needed for Sleep for up to 30 doses. Max Daily Amount: 5 mg, Disp-30 tablet, R-0Normal    Good sleep hygiene recommended. We will do Ambien 5 mg nightly as needed. Prescription as above. OARRS report reviewed no contraindications or problems noted. Order given for yearly mammogram for breast cancer screening    She declines a referral to GI for follow-up colonoscopy. She will notify me if she changes her mind. She declines pneumococcal vaccine today    She will follow-up with her specialists as planned    Increase pravastatin 80 mg daily. She would like to use up her current 40 mg tablets. She will notify me when she needs a refill of the medication at the higher dose. Recommendations for Preventive Services Due: see orders and patient instructions/AVS.    Recommended screening schedule for the next 5-10 years is provided to the patient in written form: see Patient Instructions/AVS.     Return in about 1 year (around 8/15/2024) for AWV. If she remains on Ambien and needs refills, she will need to be seen every 6 months. Subjective     Patient doing relatively well with the exception of difficulty sleeping. This has been longstanding issue for her but has become worse since she had her hysterectomy. She is experiencing night sweats which wake her up. She reports that when she takes Ambien she sleeps for 5 hours and feels rested. She tries not to use medication anymore than she needs it. Her blood pressures have been stable.   She takes her

## 2023-09-11 DIAGNOSIS — I10 ESSENTIAL HYPERTENSION: Chronic | ICD-10-CM

## 2023-09-11 DIAGNOSIS — E78.5 HYPERLIPIDEMIA, UNSPECIFIED HYPERLIPIDEMIA TYPE: ICD-10-CM

## 2023-09-11 RX ORDER — PRAVASTATIN SODIUM 80 MG/1
80 TABLET ORAL DAILY
Qty: 90 TABLET | Refills: 3 | Status: SHIPPED | OUTPATIENT
Start: 2023-09-11

## 2023-09-11 RX ORDER — LISINOPRIL 20 MG/1
20 TABLET ORAL DAILY
Qty: 90 TABLET | Refills: 3 | Status: SHIPPED | OUTPATIENT
Start: 2023-09-11 | End: 2024-09-11

## 2023-09-11 NOTE — TELEPHONE ENCOUNTER
This medication refill is regarding a fax request. Refill requested by  Pharmacy . Requested Prescriptions     Pending Prescriptions Disp Refills    lisinopril (PRINIVIL;ZESTRIL) 20 MG tablet 30 tablet 0     Sig: Take 1 tablet by mouth daily    pravastatin (PRAVACHOL) 80 MG tablet 30 tablet      Sig: Take 1 tablet by mouth daily       Date of last visit: 8/15/2023   Date of next visit: 8/20/2024  Date of last refill:     Lisinopril 7/28/23  #30/0  Pravastatin 8/15/23    Pharmacy Name: [id number] verified, ordered and set to EP. Med Pended.

## 2023-09-20 ENCOUNTER — TELEPHONE (OUTPATIENT)
Dept: UROLOGY | Age: 68
End: 2023-09-20

## 2023-09-20 NOTE — TELEPHONE ENCOUNTER
Patient scheduled for US RENAL COMP  at [facility] on 7/2/2024. Arrival of 1245PM for a 1PM scan time. NO CARBONATED BEVERAGES; ARRIVE WELL HYDRATED WITH A FULL BLADDER.  Order mailed with instructions  to the patient

## 2023-09-25 DIAGNOSIS — F51.01 PRIMARY INSOMNIA: ICD-10-CM

## 2023-09-25 RX ORDER — ZOLPIDEM TARTRATE 5 MG/1
5 TABLET ORAL NIGHTLY PRN
Qty: 30 TABLET | Refills: 0 | Status: SHIPPED | OUTPATIENT
Start: 2023-09-25 | End: 2023-10-25

## 2023-09-25 NOTE — TELEPHONE ENCOUNTER
Prescription sent to the pharmacy as below. Please notify the patient. Requested Prescriptions     Signed Prescriptions Disp Refills    zolpidem (AMBIEN) 5 MG tablet 30 tablet 0     Sig: Take 1 tablet by mouth nightly as needed for Sleep for up to 30 doses. Max Daily Amount: 5 mg     Authorizing Provider: Levar Clayton report reviewed and no contraindications or problems noted.           Electronically signed by Sneha Dee MD on 9/25/2023 at 2:43 PM

## 2023-09-25 NOTE — TELEPHONE ENCOUNTER
----- Message from Marixa Garyliv sent at 9/25/2023 12:55 PM EDT -----  Subject: Refill Request    QUESTIONS  Name of Medication? zolpidem (AMBIEN) 5 MG tablet  Patient-reported dosage and instructions? Take 1 tablet by mouth nightly   as needed for Sleep for up to 30 doses. Max Daily Amount? 5 mg  How many days do you have left? 5  Preferred Pharmacy? [facility] phone number (if available)? [phone]  ---------------------------------------------------------------------------  --------------  CALL BACK INFO  What is the best way for the office to contact you? OK to leave message on   voicemail  Preferred Call Back Phone Number? [phone]  ---------------------------------------------------------------------------  --------------  SCRIPT ANSWERS  Relationship to Patient?  Self

## 2023-09-25 NOTE — TELEPHONE ENCOUNTER
This medication refill is regarding a telephone request. Refill requested by patient. Requested Prescriptions     Pending Prescriptions Disp Refills    zolpidem (AMBIEN) 5 MG tablet 30 tablet 0     Sig: Take 1 tablet by mouth nightly as needed for Sleep for up to 30 doses. Max Daily Amount: 5 mg     Date of last visit: 8/15/2023   Date of next visit: 8/20/2024  Date of last refill: 8/15/23 #30/0  Pharmacy Name: [id number] verified, ordered and set to EP.

## 2023-10-12 ENCOUNTER — HOSPITAL ENCOUNTER (OUTPATIENT)
Dept: MAMMOGRAPHY | Age: 68
Discharge: HOME | End: 2023-10-12
Attending: FAMILY MEDICINE
Payer: MEDICARE

## 2023-10-12 VITALS — HEIGHT: 64 IN | WEIGHT: 170 LBS | BODY MASS INDEX: 29.02 KG/M2

## 2023-10-12 DIAGNOSIS — Z12.31 ENCOUNTER FOR SCREENING MAMMOGRAM FOR MALIGNANT NEOPLASM OF BREAST: ICD-10-CM

## 2023-10-12 PROCEDURE — 77063 BREAST TOMOSYNTHESIS BI: CPT

## 2023-11-16 DIAGNOSIS — F51.01 PRIMARY INSOMNIA: ICD-10-CM

## 2023-11-16 RX ORDER — ZOLPIDEM TARTRATE 5 MG/1
5 TABLET ORAL NIGHTLY PRN
Qty: 30 TABLET | Refills: 0 | Status: SHIPPED | OUTPATIENT
Start: 2023-11-16 | End: 2023-12-16

## 2023-11-16 NOTE — TELEPHONE ENCOUNTER
Patient requesting refill of Ambien 5 mg qhs prn. Last seen 8/15/23, next appt 8/20/24. Med verified. Last written 9/25/23, #30/NR. Order pended.   P

## 2023-11-16 NOTE — TELEPHONE ENCOUNTER
Prescription sent to the pharmacy as below. Requested Prescriptions     Signed Prescriptions Disp Refills    zolpidem (AMBIEN) 5 MG tablet 30 tablet 0     Sig: Take 1 tablet by mouth nightly as needed for Sleep for up to 30 doses. Max Daily Amount: 5 mg     Authorizing Provider: Dyan Paget report reviewed and no contraindications or problems noted.           Electronically signed by Leonila Esqueda MD on 11/16/2023 at 2:07 PM

## 2024-01-02 DIAGNOSIS — F51.01 PRIMARY INSOMNIA: ICD-10-CM

## 2024-01-02 RX ORDER — ZOLPIDEM TARTRATE 5 MG/1
5 TABLET ORAL NIGHTLY PRN
Qty: 30 TABLET | Refills: 0 | Status: SHIPPED | OUTPATIENT
Start: 2024-01-02 | End: 2024-07-06

## 2024-01-02 NOTE — TELEPHONE ENCOUNTER
This medication refill is regarding a refill. Refill requested by pt.    Requested Prescriptions     Pending Prescriptions Disp Refills    zolpidem (AMBIEN) 5 MG tablet 30 tablet 0     Sig: Take 1 tablet by mouth nightly as needed for Sleep for up to 30 doses.       Date of last visit: 8/15/2023   Date of next visit: 8/20/2024  Date of last refill: 2/11/23  Pharmacy Name: [facility]    [facility]    Last Lipid Panel:    Lab Results   Component Value Date/Time    CHOL 211 08/07/2023 12:00 AM    TRIG 189 08/07/2023 12:00 AM    HDL 40 08/07/2023 12:00 AM    LDLCALC 133 08/07/2023 12:00 AM     Last CMP:   Lab Results   Component Value Date     04/07/2023    K 5.0 04/07/2023     04/07/2023    CO2 27 04/07/2023    BUN 18 04/07/2023    CREATININE 0.6 04/07/2023    GLUCOSE 107 04/07/2023    CALCIUM 10.5 04/07/2023    PROT 6.9 01/15/2023    LABALBU 3.3 (L) 01/15/2023    BILITOT 0.5 01/15/2023    ALKPHOS 70 01/15/2023    AST 13 01/15/2023    ALT 10 (L) 01/15/2023    LABGLOM >=90 04/07/2023       Last Thyroid:  No results found for: \"TSH\", \"A6QJSIZ\", \"J1IBQHS\", \"THYROIDAB\", \"FT3\", \"T4FREE\"  Last Hemoglobin A1C:    Lab Results   Component Value Date/Time    LABA1C 5.7 08/07/2023 12:00 AM    AVGG 117 08/07/2023 12:00 AM       Rx verified, ordered and set to EP.

## 2024-01-02 NOTE — TELEPHONE ENCOUNTER
Prescription sent to the pharmacy as below.      Requested Prescriptions     Signed Prescriptions Disp Refills    zolpidem (AMBIEN) 5 MG tablet 30 tablet 0     Sig: Take 1 tablet by mouth nightly as needed for Sleep for up to 30 doses.     Authorizing Provider: BINU RICKS report reviewed and no contraindications or problems noted.          Electronically signed by Binu Ricks MD on 1/2/2024 at 10:17 AM

## 2024-01-22 ENCOUNTER — OFFICE VISIT (OUTPATIENT)
Dept: FAMILY MEDICINE CLINIC | Age: 69
End: 2024-01-22
Payer: MEDICARE

## 2024-01-22 VITALS
WEIGHT: 178 LBS | OXYGEN SATURATION: 96 % | SYSTOLIC BLOOD PRESSURE: 120 MMHG | HEART RATE: 86 BPM | DIASTOLIC BLOOD PRESSURE: 80 MMHG | BODY MASS INDEX: 30.39 KG/M2 | HEIGHT: 64 IN

## 2024-01-22 DIAGNOSIS — L30.9 ACUTE DERMATITIS: Primary | ICD-10-CM

## 2024-01-22 PROCEDURE — 1123F ACP DISCUSS/DSCN MKR DOCD: CPT | Performed by: NURSE PRACTITIONER

## 2024-01-22 PROCEDURE — 3074F SYST BP LT 130 MM HG: CPT | Performed by: NURSE PRACTITIONER

## 2024-01-22 PROCEDURE — 3079F DIAST BP 80-89 MM HG: CPT | Performed by: NURSE PRACTITIONER

## 2024-01-22 PROCEDURE — 99213 OFFICE O/P EST LOW 20 MIN: CPT | Performed by: NURSE PRACTITIONER

## 2024-01-22 RX ORDER — PREDNISONE 10 MG/1
10 TABLET ORAL 2 TIMES DAILY
Qty: 10 TABLET | Refills: 0 | Status: SHIPPED | OUTPATIENT
Start: 2024-01-22 | End: 2024-01-27

## 2024-01-22 RX ORDER — POLYMYXIN B SULFATE AND TRIMETHOPRIM 1; 10000 MG/ML; [USP'U]/ML
1 SOLUTION OPHTHALMIC EVERY 4 HOURS
Qty: 3 ML | Refills: 0 | Status: SHIPPED | OUTPATIENT
Start: 2024-01-22 | End: 2024-01-29

## 2024-01-22 ASSESSMENT — PATIENT HEALTH QUESTIONNAIRE - PHQ9
SUM OF ALL RESPONSES TO PHQ9 QUESTIONS 1 & 2: 0
1. LITTLE INTEREST OR PLEASURE IN DOING THINGS: 0
SUM OF ALL RESPONSES TO PHQ QUESTIONS 1-9: 0
SUM OF ALL RESPONSES TO PHQ QUESTIONS 1-9: 0
2. FEELING DOWN, DEPRESSED OR HOPELESS: 0
SUM OF ALL RESPONSES TO PHQ QUESTIONS 1-9: 0
SUM OF ALL RESPONSES TO PHQ QUESTIONS 1-9: 0

## 2024-01-22 ASSESSMENT — ENCOUNTER SYMPTOMS
SHORTNESS OF BREATH: 0
CONSTIPATION: 0
DIARRHEA: 0
BLOOD IN STOOL: 0
NAUSEA: 0
VOMITING: 0

## 2024-01-22 NOTE — PROGRESS NOTES
Chief Complaint   Patient presents with    Eye Problem     Left eye itchiness/irritation, symptoms started last thursday         SUBJECTIVE     Zoe Parks is a 68 y.o.female      Pt complains of bilat eyelid itching. States it started last week while in [place]. Was watching her grandson's practice and her eyelids started to itch and burn. Unsure if she came into contact with something that caused an allergic reaction. States the eyelids are dry, slightly swollen, and itch.She initially used neosporin but Saturday started using triamcinolone and polytrim with some relief.   Occasionally has some eye watering and itching. Would like a refill of the polytrim.    Review of Systems   Constitutional:  Negative for chills, diaphoresis and fever.   Eyes:  Positive for discharge (watery).   Respiratory:  Negative for shortness of breath.    Cardiovascular:  Negative for chest pain, palpitations and leg swelling.   Gastrointestinal:  Negative for blood in stool, constipation, diarrhea, nausea and vomiting.   Genitourinary:  Negative for dysuria and hematuria.   Musculoskeletal:  Negative for myalgias.   Skin:  Positive for rash (bilat eyelids).   Neurological:  Negative for dizziness and headaches.   All other systems reviewed and are negative.        OBJECTIVE     /80   Pulse 86   Ht 1.626 m (5' 4\")   Wt 80.7 kg (178 lb)   SpO2 96%   BMI 30.55 kg/m²     Physical Exam  Vitals and nursing note reviewed.   Constitutional:       Appearance: She is well-developed.   HENT:      Head: Normocephalic and atraumatic.   Eyes:      Conjunctiva/sclera: Conjunctivae normal.      Pupils: Pupils are equal, round, and reactive to light.   Cardiovascular:      Rate and Rhythm: Normal rate and regular rhythm.      Heart sounds: Normal heart sounds.   Pulmonary:      Effort: Pulmonary effort is normal.      Breath sounds: Normal breath sounds.   Musculoskeletal:         General: Normal range of motion.      Cervical

## 2024-01-23 ASSESSMENT — ENCOUNTER SYMPTOMS: EYE DISCHARGE: 1

## 2024-02-01 ENCOUNTER — OFFICE VISIT (OUTPATIENT)
Dept: FAMILY MEDICINE CLINIC | Age: 69
End: 2024-02-01
Payer: MEDICARE

## 2024-02-01 VITALS
HEART RATE: 68 BPM | DIASTOLIC BLOOD PRESSURE: 76 MMHG | BODY MASS INDEX: 30.04 KG/M2 | WEIGHT: 175 LBS | SYSTOLIC BLOOD PRESSURE: 128 MMHG | RESPIRATION RATE: 16 BRPM | TEMPERATURE: 97.6 F

## 2024-02-01 DIAGNOSIS — F51.01 PRIMARY INSOMNIA: ICD-10-CM

## 2024-02-01 DIAGNOSIS — R73.01 IFG (IMPAIRED FASTING GLUCOSE): ICD-10-CM

## 2024-02-01 DIAGNOSIS — E78.5 HYPERLIPIDEMIA, UNSPECIFIED HYPERLIPIDEMIA TYPE: ICD-10-CM

## 2024-02-01 DIAGNOSIS — I10 ESSENTIAL HYPERTENSION: Primary | ICD-10-CM

## 2024-02-01 DIAGNOSIS — Z12.11 SCREENING FOR COLON CANCER: ICD-10-CM

## 2024-02-01 PROCEDURE — 3074F SYST BP LT 130 MM HG: CPT | Performed by: FAMILY MEDICINE

## 2024-02-01 PROCEDURE — 99214 OFFICE O/P EST MOD 30 MIN: CPT | Performed by: FAMILY MEDICINE

## 2024-02-01 PROCEDURE — 1123F ACP DISCUSS/DSCN MKR DOCD: CPT | Performed by: FAMILY MEDICINE

## 2024-02-01 PROCEDURE — 3078F DIAST BP <80 MM HG: CPT | Performed by: FAMILY MEDICINE

## 2024-02-01 RX ORDER — ZOLPIDEM TARTRATE 5 MG/1
5 TABLET ORAL NIGHTLY PRN
Qty: 30 TABLET | Refills: 5 | Status: SHIPPED | OUTPATIENT
Start: 2024-02-01 | End: 2024-08-05

## 2024-02-01 ASSESSMENT — ENCOUNTER SYMPTOMS
EYES NEGATIVE: 1
VOMITING: 0
DIARRHEA: 0
ABDOMINAL PAIN: 0
NAUSEA: 0
BLOOD IN STOOL: 0
SHORTNESS OF BREATH: 0

## 2024-02-01 ASSESSMENT — PATIENT HEALTH QUESTIONNAIRE - PHQ9
1. LITTLE INTEREST OR PLEASURE IN DOING THINGS: NOT AT ALL
SUM OF ALL RESPONSES TO PHQ QUESTIONS 1-9: 0
SUM OF ALL RESPONSES TO PHQ9 QUESTIONS 1 & 2: 0
1. LITTLE INTEREST OR PLEASURE IN DOING THINGS: 0
SUM OF ALL RESPONSES TO PHQ9 QUESTIONS 1 & 2: 0
2. FEELING DOWN, DEPRESSED OR HOPELESS: NOT AT ALL
2. FEELING DOWN, DEPRESSED OR HOPELESS: 0

## 2024-02-01 NOTE — PROGRESS NOTES
2024      Zoe Parks (:  1955) is a 68 y.o. female,Established patient, here for evaluation of the following chief complaint(s):  6 Month Follow-Up (Here today for 6 month f/up for HTN, hyperlipidemia and insomnia. ) and Hot flashes (C/O hot flashes since having hysterectomy, worse at night. )        ASSESSMENT/PLAN     1. Essential hypertension  -     Lipid Panel; Future  -     Comprehensive Metabolic Panel; Future  -     CBC with Auto Differential; Future  2. Primary insomnia  -     zolpidem (AMBIEN) 5 MG tablet; Take 1 tablet by mouth nightly as needed for Sleep for up to 180 doses. Max Daily Amount: 5 mg, Disp-30 tablet, R-5Normal  3. Hyperlipidemia, unspecified hyperlipidemia type  -     Lipid Panel; Future  -     Comprehensive Metabolic Panel; Future  4. IFG (impaired fasting glucose)  -     Hemoglobin A1C; Future  5. Screening for colon cancer  -     GRADY - Emmanuel Cordova MD, Gastroenterology, [place]    Refer to GI for screening colonoscopy    Labs as above before next visit    Okay for trial of Estroven or Remifemin for hot flashes    Okay to continue Ambien as above.  OARRS reports are reviewed regularly and have not been problematic.     Return in about 6 months (around 2024).    SUBJECTIVE     Zoe Parks is a 68 y.o.female      Here for follow up of chronic health problems including:    Patient Active Problem List   Diagnosis    Hyperlipidemia    Insomnia    Essential hypertension    Elevated AST (SGOT)    IFG (impaired fasting glucose)     Patient denies complaint today with the exception of some hot flashes and night sweats.  This disturbs her sleep.  She uses Ambien as needed.  She is wondering if there is a supplement that could possibly help with her hot flashes.  The started after her hysterectomy.  Blood pressures have been stable.  She takes her prescribed medications as directed and denies side effects.  No recent illnesses or hospitalizations.  She remains active

## 2024-02-02 NOTE — PROGRESS NOTES
Referral and notes faxed to [facility] (Dr. Cordova's office). They will contact the pt to schedule.

## 2024-02-14 ENCOUNTER — TELEPHONE (OUTPATIENT)
Dept: FAMILY MEDICINE CLINIC | Age: 69
End: 2024-02-14

## 2024-02-14 NOTE — TELEPHONE ENCOUNTER
If symptoms have returned and are not resolving with polytrim, I would like her to get in with her eye provider to take a better look at the eyes. TS

## 2024-02-14 NOTE — TELEPHONE ENCOUNTER
C/O itching and redness in the inside corner and upper eyelids on both eyes with redness of the eyeball that has flared up for the past few days. Eyes also tearing every morning but there is no drainage. Using Neosporin, Sudafed and Polytrim that she was prescribed on 1/22/24 and that seems to help somewhat. Symptoms started last Thursday and then calmed down. Has also used Hydrocortisone cream and that didn't help. Pt had the same symptoms and was seen in the office by TS on 1/22/24. Offered appt but pt wanted a message sent first to see if there are any further recommendations. Please advise.

## 2024-07-02 ENCOUNTER — HOSPITAL ENCOUNTER (OUTPATIENT)
Dept: ULTRASOUND IMAGING | Age: 69
Discharge: HOME | End: 2024-07-02
Payer: MEDICARE

## 2024-07-02 DIAGNOSIS — R31.29 MICROHEMATURIA: ICD-10-CM

## 2024-07-02 DIAGNOSIS — N20.0 LEFT NEPHROLITHIASIS: ICD-10-CM

## 2024-07-02 DIAGNOSIS — N13.30 HYDRONEPHROSIS OF RIGHT KIDNEY: ICD-10-CM

## 2024-07-02 PROCEDURE — 76770 US EXAM ABDO BACK WALL COMP: CPT

## 2024-07-11 ENCOUNTER — OFFICE VISIT (OUTPATIENT)
Dept: UROLOGY | Age: 69
End: 2024-07-11
Payer: MEDICARE

## 2024-07-11 VITALS — RESPIRATION RATE: 18 BRPM | BODY MASS INDEX: 29.02 KG/M2 | WEIGHT: 170 LBS | HEIGHT: 64 IN

## 2024-07-11 DIAGNOSIS — N13.30 HYDRONEPHROSIS OF RIGHT KIDNEY: Primary | ICD-10-CM

## 2024-07-11 DIAGNOSIS — N20.0 LEFT NEPHROLITHIASIS: ICD-10-CM

## 2024-07-11 PROCEDURE — 99213 OFFICE O/P EST LOW 20 MIN: CPT

## 2024-07-11 PROCEDURE — 1123F ACP DISCUSS/DSCN MKR DOCD: CPT

## 2024-07-11 NOTE — PROGRESS NOTES
BUN/Creatinine:  Lab Results   Component Value Date/Time    BUN 18 04/07/2023 12:00 AM    CREATININE 0.6 04/07/2023 12:00 AM       Radiology  The patient has had a Renal Ultrasound which I have independently reviewed along with its accompanying report.  The study demonstrates     FINDINGS: The right kidney measures 11.5 x 5.4 x 4.8 cm and the left kidney measures 11.9 x 6.0 x 5.3 cm. Renal cortical thickness is normal. An echogenic structure in the lower left kidney measures 0.5 cm. There is no hydronephrosis.     Color Doppler demonstrates expected waveforms in the bilateral renal arteries.  Arcuate resistive indices are normal bilaterally.     The distended urinary bladder is unremarkable. There is a small amount of postvoid residual urine in the bladder.     IMPRESSION:  1. Left-sided nephrolithiasis.  2. Small amount of postvoid residual urine but otherwise unremarkable urinary bladder.       Assessment:   Hx of R hydronephrosis   L Nephrolithiasis     Plan:     Hx of R hydronephrosis: Resolved following AVIVA    L Nephrolithiasis: Noted again on renal US in 7/2024. Discussed surveillance imaging. Patient declines at this time.   She was encouraged to call should she develop any blood in the urine or flank pain        Charlotte Mendoza PA-C  Urology

## 2024-07-11 NOTE — PATIENT INSTRUCTIONS
Call if you need anything  Call with questions, comments, or concerns. I recommend going to the ED for further evaluation if you develop fever, chills, nausea, vomiting, chest pain, SOB, or calf pain.

## 2024-07-24 LAB
CHOLESTEROL, TOTAL: 185 MG/DL
CHOLESTEROL/HDL RATIO: NORMAL
ESTIMATED AVERAGE GLUCOSE: 126
HBA1C MFR BLD: 6 %
HDLC SERPL-MCNC: 40 MG/DL (ref 35–70)
LDL CHOLESTEROL: 113
NONHDLC SERPL-MCNC: NORMAL MG/DL
TRIGL SERPL-MCNC: 159 MG/DL
VLDLC SERPL CALC-MCNC: NORMAL MG/DL

## 2024-08-17 SDOH — ECONOMIC STABILITY: FOOD INSECURITY: WITHIN THE PAST 12 MONTHS, THE FOOD YOU BOUGHT JUST DIDN'T LAST AND YOU DIDN'T HAVE MONEY TO GET MORE.: NEVER TRUE

## 2024-08-17 SDOH — HEALTH STABILITY: PHYSICAL HEALTH: ON AVERAGE, HOW MANY DAYS PER WEEK DO YOU ENGAGE IN MODERATE TO STRENUOUS EXERCISE (LIKE A BRISK WALK)?: 4 DAYS

## 2024-08-17 SDOH — ECONOMIC STABILITY: FOOD INSECURITY: WITHIN THE PAST 12 MONTHS, YOU WORRIED THAT YOUR FOOD WOULD RUN OUT BEFORE YOU GOT MONEY TO BUY MORE.: NEVER TRUE

## 2024-08-17 SDOH — ECONOMIC STABILITY: INCOME INSECURITY: HOW HARD IS IT FOR YOU TO PAY FOR THE VERY BASICS LIKE FOOD, HOUSING, MEDICAL CARE, AND HEATING?: NOT HARD AT ALL

## 2024-08-17 SDOH — HEALTH STABILITY: PHYSICAL HEALTH: ON AVERAGE, HOW MANY MINUTES DO YOU ENGAGE IN EXERCISE AT THIS LEVEL?: 20 MIN

## 2024-08-17 ASSESSMENT — LIFESTYLE VARIABLES
HOW MANY STANDARD DRINKS CONTAINING ALCOHOL DO YOU HAVE ON A TYPICAL DAY: 1 OR 2
HOW OFTEN DO YOU HAVE A DRINK CONTAINING ALCOHOL: MONTHLY OR LESS
HOW MANY STANDARD DRINKS CONTAINING ALCOHOL DO YOU HAVE ON A TYPICAL DAY: 1
HOW OFTEN DO YOU HAVE SIX OR MORE DRINKS ON ONE OCCASION: 1
HOW OFTEN DO YOU HAVE A DRINK CONTAINING ALCOHOL: 2

## 2024-08-17 ASSESSMENT — PATIENT HEALTH QUESTIONNAIRE - PHQ9
SUM OF ALL RESPONSES TO PHQ QUESTIONS 1-9: 0
SUM OF ALL RESPONSES TO PHQ9 QUESTIONS 1 & 2: 0
2. FEELING DOWN, DEPRESSED OR HOPELESS: NOT AT ALL
SUM OF ALL RESPONSES TO PHQ QUESTIONS 1-9: 0
1. LITTLE INTEREST OR PLEASURE IN DOING THINGS: NOT AT ALL
SUM OF ALL RESPONSES TO PHQ QUESTIONS 1-9: 0
SUM OF ALL RESPONSES TO PHQ QUESTIONS 1-9: 0

## 2024-08-20 ENCOUNTER — OFFICE VISIT (OUTPATIENT)
Dept: FAMILY MEDICINE CLINIC | Age: 69
End: 2024-08-20
Payer: MEDICARE

## 2024-08-20 VITALS
HEART RATE: 60 BPM | WEIGHT: 174.6 LBS | RESPIRATION RATE: 12 BRPM | BODY MASS INDEX: 29.81 KG/M2 | HEIGHT: 64 IN | SYSTOLIC BLOOD PRESSURE: 126 MMHG | DIASTOLIC BLOOD PRESSURE: 70 MMHG | TEMPERATURE: 97.5 F

## 2024-08-20 DIAGNOSIS — Z12.11 SCREENING FOR COLON CANCER: ICD-10-CM

## 2024-08-20 DIAGNOSIS — I10 ESSENTIAL HYPERTENSION: Chronic | ICD-10-CM

## 2024-08-20 DIAGNOSIS — H61.21 IMPACTED CERUMEN OF RIGHT EAR: ICD-10-CM

## 2024-08-20 DIAGNOSIS — F51.01 PRIMARY INSOMNIA: ICD-10-CM

## 2024-08-20 DIAGNOSIS — Z00.00 MEDICARE ANNUAL WELLNESS VISIT, SUBSEQUENT: Primary | ICD-10-CM

## 2024-08-20 DIAGNOSIS — Z12.31 ENCOUNTER FOR SCREENING MAMMOGRAM FOR MALIGNANT NEOPLASM OF BREAST: ICD-10-CM

## 2024-08-20 DIAGNOSIS — E78.5 HYPERLIPIDEMIA, UNSPECIFIED HYPERLIPIDEMIA TYPE: ICD-10-CM

## 2024-08-20 PROCEDURE — 1123F ACP DISCUSS/DSCN MKR DOCD: CPT | Performed by: FAMILY MEDICINE

## 2024-08-20 PROCEDURE — G0439 PPPS, SUBSEQ VISIT: HCPCS | Performed by: FAMILY MEDICINE

## 2024-08-20 PROCEDURE — 3074F SYST BP LT 130 MM HG: CPT | Performed by: FAMILY MEDICINE

## 2024-08-20 PROCEDURE — 3078F DIAST BP <80 MM HG: CPT | Performed by: FAMILY MEDICINE

## 2024-08-20 PROCEDURE — 99213 OFFICE O/P EST LOW 20 MIN: CPT | Performed by: FAMILY MEDICINE

## 2024-08-20 RX ORDER — PRAVASTATIN SODIUM 80 MG/1
80 TABLET ORAL DAILY
Qty: 90 TABLET | Refills: 3 | Status: SHIPPED | OUTPATIENT
Start: 2024-08-20

## 2024-08-20 RX ORDER — LISINOPRIL 20 MG/1
20 TABLET ORAL DAILY
Qty: 90 TABLET | Refills: 3 | Status: SHIPPED | OUTPATIENT
Start: 2024-08-20 | End: 2025-08-21

## 2024-08-20 RX ORDER — ZOLPIDEM TARTRATE 5 MG/1
5 TABLET ORAL NIGHTLY PRN
Qty: 30 TABLET | Refills: 5 | Status: SHIPPED | OUTPATIENT
Start: 2024-08-20 | End: 2025-02-20

## 2024-08-20 NOTE — PATIENT INSTRUCTIONS
years to screen for glaucoma; cataracts, macular degeneration, and other eye disorders.  A preventive dental visit is recommended every 6 months.  Try to get at least 150 minutes of exercise per week or 10,000 steps per day on a pedometer .  Order or download the FREE \"Exercise & Physical Activity: Your Everyday Guide\" from [employer]. Call 1-984.261.1939 or search [employer] online.  You need 0088-7636 mg of calcium and 3820-9251 IU of vitamin D per day. It is possible to meet your calcium requirement with diet alone, but a vitamin D supplement is usually necessary to meet this goal.  When exposed to the sun, use a sunscreen that protects against both UVA and UVB radiation with an SPF of 30 or greater. Reapply every 2 to 3 hours or after sweating, drying off with a towel, or swimming.  Always wear a seat belt when traveling in a car. Always wear a helmet when riding a bicycle or motorcycle.

## 2024-08-20 NOTE — PROGRESS NOTES
screening schedule for the next 5-10 years is provided to the patient in written form: see Patient Instructions/AVS.     Return in about 6 months (around 2/20/2025) for Follow up.     Subjective     Patient doing relatively well with the exception of ongoing issues with insomnia.  She has tried various home remedies and is improved her sleep hygiene but she continues to struggle with initiating and maintaining sleep.  She has tried melatonin Tylenol PM, and scheduled sleep.  Ambien is the only thing that has worked for her in the past and she would like to restart this.  OARRS reports have not been problematic.  Her chronic conditions including hypertension and hyperlipidemia have been stable.  She takes her prescribed medications as directed and denies side effects.  No recent illnesses or hospitalizations.  Non-smoker.  BMI 29.97.      Patient's complete Health Risk Assessment and screening values have been reviewed and are found in Flowsheets. The following problems were reviewed today and where indicated follow up appointments were made and/or referrals ordered.    No Positive Risk Factors identified today.                                    Objective   Vitals:    08/20/24 1357   BP: 126/70   Pulse: 60   Resp: 12   Temp: 97.5 °F (36.4 °C)   TempSrc: Oral   Weight: 79.2 kg (174 lb 9.6 oz)   Height: 1.626 m (5' 4\")      Body mass index is 29.97 kg/m².        General Appearance: alert and oriented to person, place and time, well developed and well- nourished, in no acute distress  Skin: warm and dry, no rash or erythema  Head: normocephalic and atraumatic  Eyes: pupils equal, round, and reactive to light, extraocular eye movements intact, conjunctivae normal  ENT: Cerumen impaction noted in the right ear canal  Neck: supple and non-tender without mass, no thyromegaly or thyroid nodules, no cervical lymphadenopathy  Pulmonary/Chest: clear to auscultation bilaterally- no wheezes, rales or rhonchi, normal air

## 2024-09-13 LAB — NONINV COLON CA DNA+OCC BLD SCRN STL QL: NEGATIVE

## 2024-10-15 ENCOUNTER — HOSPITAL ENCOUNTER (OUTPATIENT)
Dept: MAMMOGRAPHY | Age: 69
Discharge: HOME | End: 2024-10-15
Payer: MEDICARE

## 2024-10-15 VITALS — HEIGHT: 64 IN | BODY MASS INDEX: 29.37 KG/M2 | WEIGHT: 172 LBS

## 2024-10-15 DIAGNOSIS — Z12.31 ENCOUNTER FOR SCREENING MAMMOGRAM FOR MALIGNANT NEOPLASM OF BREAST: ICD-10-CM

## 2024-10-15 PROCEDURE — 77063 BREAST TOMOSYNTHESIS BI: CPT

## 2024-11-25 ENCOUNTER — OFFICE VISIT (OUTPATIENT)
Dept: FAMILY MEDICINE CLINIC | Age: 69
End: 2024-11-25

## 2024-11-25 VITALS
HEART RATE: 80 BPM | SYSTOLIC BLOOD PRESSURE: 128 MMHG | BODY MASS INDEX: 30.2 KG/M2 | DIASTOLIC BLOOD PRESSURE: 84 MMHG | RESPIRATION RATE: 16 BRPM | WEIGHT: 176 LBS | TEMPERATURE: 97.7 F

## 2024-11-25 DIAGNOSIS — M54.31 SCIATICA OF RIGHT SIDE: ICD-10-CM

## 2024-11-25 DIAGNOSIS — M25.551 RIGHT HIP PAIN: Primary | ICD-10-CM

## 2024-11-25 RX ORDER — METHYLPREDNISOLONE ACETATE 80 MG/ML
80 INJECTION, SUSPENSION INTRA-ARTICULAR; INTRALESIONAL; INTRAMUSCULAR; SOFT TISSUE ONCE
Status: COMPLETED | OUTPATIENT
Start: 2024-11-25 | End: 2024-11-25

## 2024-11-25 RX ADMIN — METHYLPREDNISOLONE ACETATE 80 MG: 80 INJECTION, SUSPENSION INTRA-ARTICULAR; INTRALESIONAL; INTRAMUSCULAR; SOFT TISSUE at 13:04

## 2024-11-25 ASSESSMENT — ENCOUNTER SYMPTOMS
BACK PAIN: 0
COLOR CHANGE: 0

## 2024-11-25 NOTE — PROGRESS NOTES
Administrations This Visit       methylPREDNISolone acetate (DEPO-MEDROL) injection 80 mg       Admin Date  11/25/2024  13:04 Action  Given Dose  80 mg Route  IntraMUSCular Site  Deltoid Left Documented By  Kocher, Taylor, CMA ([facility])    NDC: 3711-5356-38    Lot#: [id number]    : [employer] [place]    Patient Supplied?: No

## 2024-11-25 NOTE — PROGRESS NOTES
[facility]  [facility]  [address] [address]  Dept: [phone]  Dept Fax: [phone]  Loc: [phone]     Visit Date:  11/25/2024      Patient:  Zoe Parks  YOB: 1955    HPI:     Chief Complaint   Patient presents with    Hip Pain     Right side, recent vacation,        Pt presents to the office today for right hip pain.  She traveled to [place] and got back 2 weeks ago and the pain started.  No trauma or fall.  Right hip into right groin.  Denies low back pain.  Went to chiropractor 2 days ago with no changes.     Hip Pain   There was no injury mechanism. The pain is present in the right hip. The quality of the pain is described as aching and shooting. The pain is moderate. The pain has been Fluctuating since onset. Associated symptoms include muscle weakness. Pertinent negatives include no inability to bear weight, loss of motion, loss of sensation, numbness or tingling. The symptoms are aggravated by movement, palpation and weight bearing. She has tried acetaminophen, heat, NSAIDs and rest for the symptoms. The treatment provided mild relief.       Medications    Current Outpatient Medications:     lisinopril (PRINIVIL;ZESTRIL) 20 MG tablet, Take 1 tablet by mouth daily, Disp: 90 tablet, Rfl: 3    pravastatin (PRAVACHOL) 80 MG tablet, Take 1 tablet by mouth daily, Disp: 90 tablet, Rfl: 3    zolpidem (AMBIEN) 5 MG tablet, Take 1 tablet by mouth nightly as needed for Sleep for up to 180 doses. Max Daily Amount: 5 mg, Disp: 30 tablet, Rfl: 5    aspirin 81 MG tablet, Take 1 tablet by mouth daily, Disp: , Rfl:     The patient is allergic to pcn [penicillins].    Past Medical History  Zoe  has a past medical history of Herpes zoster, Hyperlipidemia, and Hypertension.    Subjective:      Review of Systems   Constitutional:  Negative for chills, fatigue and fever.   Musculoskeletal:  Positive for arthralgias. Negative for back

## 2024-12-03 ENCOUNTER — TELEPHONE (OUTPATIENT)
Dept: FAMILY MEDICINE CLINIC | Age: 69
End: 2024-12-03

## 2024-12-03 NOTE — TELEPHONE ENCOUNTER
Left message for patient to return office call top discuss recent X - Ray results.     Referral to PT pended

## 2024-12-03 NOTE — TELEPHONE ENCOUNTER
----- Message from NAPOLEON Robison CNP sent at 12/2/2024  4:23 PM EST -----  Please let pt know that her hip x-ray was normal.  See how she is feeling since the depo medrol injection.  I would recommend PT for further evaluation if pain continues.  OK for order if pt is willing .-WS

## 2024-12-05 NOTE — TELEPHONE ENCOUNTER
Spoke to the pt and she stated that the pain has subsided and would like to hold on PT at this time. Will contact the office if symptoms return.

## 2025-01-30 ASSESSMENT — PATIENT HEALTH QUESTIONNAIRE - PHQ9
SUM OF ALL RESPONSES TO PHQ9 QUESTIONS 1 & 2: 0
2. FEELING DOWN, DEPRESSED OR HOPELESS: NOT AT ALL
1. LITTLE INTEREST OR PLEASURE IN DOING THINGS: NOT AT ALL
1. LITTLE INTEREST OR PLEASURE IN DOING THINGS: NOT AT ALL
SUM OF ALL RESPONSES TO PHQ QUESTIONS 1-9: 0
2. FEELING DOWN, DEPRESSED OR HOPELESS: NOT AT ALL
SUM OF ALL RESPONSES TO PHQ QUESTIONS 1-9: 0
SUM OF ALL RESPONSES TO PHQ QUESTIONS 1-9: 0
SUM OF ALL RESPONSES TO PHQ9 QUESTIONS 1 & 2: 0
SUM OF ALL RESPONSES TO PHQ QUESTIONS 1-9: 0

## 2025-02-04 SDOH — ECONOMIC STABILITY: FOOD INSECURITY: WITHIN THE PAST 12 MONTHS, YOU WORRIED THAT YOUR FOOD WOULD RUN OUT BEFORE YOU GOT MONEY TO BUY MORE.: NEVER TRUE

## 2025-02-04 SDOH — ECONOMIC STABILITY: INCOME INSECURITY: IN THE LAST 12 MONTHS, WAS THERE A TIME WHEN YOU WERE NOT ABLE TO PAY THE MORTGAGE OR RENT ON TIME?: NO

## 2025-02-04 SDOH — ECONOMIC STABILITY: FOOD INSECURITY: WITHIN THE PAST 12 MONTHS, THE FOOD YOU BOUGHT JUST DIDN'T LAST AND YOU DIDN'T HAVE MONEY TO GET MORE.: NEVER TRUE

## 2025-02-05 ENCOUNTER — OFFICE VISIT (OUTPATIENT)
Dept: FAMILY MEDICINE CLINIC | Age: 70
End: 2025-02-05
Payer: MEDICARE

## 2025-02-05 VITALS
RESPIRATION RATE: 16 BRPM | SYSTOLIC BLOOD PRESSURE: 120 MMHG | DIASTOLIC BLOOD PRESSURE: 82 MMHG | HEART RATE: 64 BPM | WEIGHT: 173 LBS | TEMPERATURE: 97.5 F | BODY MASS INDEX: 29.68 KG/M2

## 2025-02-05 DIAGNOSIS — E78.5 HYPERLIPIDEMIA, UNSPECIFIED HYPERLIPIDEMIA TYPE: ICD-10-CM

## 2025-02-05 DIAGNOSIS — I10 ESSENTIAL HYPERTENSION: Primary | Chronic | ICD-10-CM

## 2025-02-05 DIAGNOSIS — F51.01 PRIMARY INSOMNIA: ICD-10-CM

## 2025-02-05 DIAGNOSIS — R73.01 IFG (IMPAIRED FASTING GLUCOSE): ICD-10-CM

## 2025-02-05 PROCEDURE — 99214 OFFICE O/P EST MOD 30 MIN: CPT | Performed by: FAMILY MEDICINE

## 2025-02-05 PROCEDURE — 1123F ACP DISCUSS/DSCN MKR DOCD: CPT | Performed by: FAMILY MEDICINE

## 2025-02-05 PROCEDURE — G2211 COMPLEX E/M VISIT ADD ON: HCPCS | Performed by: FAMILY MEDICINE

## 2025-02-05 PROCEDURE — 1159F MED LIST DOCD IN RCRD: CPT | Performed by: FAMILY MEDICINE

## 2025-02-05 PROCEDURE — 3079F DIAST BP 80-89 MM HG: CPT | Performed by: FAMILY MEDICINE

## 2025-02-05 PROCEDURE — 3074F SYST BP LT 130 MM HG: CPT | Performed by: FAMILY MEDICINE

## 2025-02-05 RX ORDER — PRAVASTATIN SODIUM 80 MG/1
80 TABLET ORAL DAILY
Qty: 90 TABLET | Refills: 3 | Status: CANCELLED | OUTPATIENT
Start: 2025-02-05

## 2025-02-05 RX ORDER — LISINOPRIL 20 MG/1
20 TABLET ORAL DAILY
Qty: 90 TABLET | Refills: 3 | Status: CANCELLED | OUTPATIENT
Start: 2025-02-05 | End: 2026-02-06

## 2025-02-05 RX ORDER — ZOLPIDEM TARTRATE 5 MG/1
5 TABLET ORAL NIGHTLY PRN
Qty: 30 TABLET | Refills: 5 | Status: SHIPPED | OUTPATIENT
Start: 2025-02-05 | End: 2025-08-10

## 2025-02-05 ASSESSMENT — ENCOUNTER SYMPTOMS
DIARRHEA: 0
VOMITING: 0
EYES NEGATIVE: 1
SHORTNESS OF BREATH: 0
NAUSEA: 0
BLOOD IN STOOL: 0
ABDOMINAL PAIN: 0

## 2025-02-05 NOTE — PROGRESS NOTES
2025      Zoe Parks (:  1955) is a 69 y.o. female,Established patient, here for evaluation of the following chief complaint(s):  Follow-up (Pt is needing some medications refilled.)        ASSESSMENT/PLAN     Assessment & Plan    1. Essential hypertension  -     Lipid Panel; Future  -     Comprehensive Metabolic Panel; Future  -     CBC with Auto Differential; Future  2. Primary insomnia  -     zolpidem (AMBIEN) 5 MG tablet; Take 1 tablet by mouth nightly as needed for Sleep for up to 180 doses. Max Daily Amount: 5 mg, Disp-30 tablet, R-5Normal  3. Hyperlipidemia, unspecified hyperlipidemia type  -     Lipid Panel; Future  -     Comprehensive Metabolic Panel; Future  4. IFG (impaired fasting glucose)  -     Hemoglobin A1C; Future    Continue lisinopril 20mg daily for HTN.  This condition is chronic and controlled.    Continue Ambien 5mg nightly prn insomnia.  Patient feels that she needs the medication and doesn't want to go without it.  OARRS report reviewed and no contraindications or problems noted.  Good sleep hygiene encouraged as well.  Condition is chronic and controlled.    Continue pravastatin 80mg daily for hyperlipidemia.  Condition is chronic and controlled.    Labs as above before next visit         Return in about 6 months (around 2025) for AWV.    SUBJECTIVE     Zoe Parks is a 69 y.o.female      Here for follow up of chronic health problems including:    Patient Active Problem List   Diagnosis    Hypercholesterolemia    Insomnia    Hypertension    Elevated AST (SGOT)    IFG (impaired fasting glucose)         History of Present Illness  The patient presents for a regular follow-up of her blood pressure, cholesterol, and insomnia.    She reports satisfactory blood pressure readings, typically around 120/82. However, she experiences occasional lightheadedness when transitioning from a seated to standing position, particularly after sitting in a recliner with her head

## 2025-07-14 LAB
CHOLESTEROL, TOTAL: 180 MG/DL
CHOLESTEROL/HDL RATIO: NORMAL
ESTIMATED AVERAGE GLUCOSE: 123
HBA1C MFR BLD: 5.9 %
HDLC SERPL-MCNC: 40 MG/DL (ref 35–70)
LDL CHOLESTEROL: 102
NONHDLC SERPL-MCNC: NORMAL MG/DL
TRIGL SERPL-MCNC: 192 MG/DL
VLDLC SERPL CALC-MCNC: 38 MG/DL

## 2025-07-17 ENCOUNTER — RESULTS FOLLOW-UP (OUTPATIENT)
Dept: FAMILY MEDICINE CLINIC | Age: 70
End: 2025-07-17

## 2025-08-23 SDOH — HEALTH STABILITY: PHYSICAL HEALTH: ON AVERAGE, HOW MANY MINUTES DO YOU ENGAGE IN EXERCISE AT THIS LEVEL?: 20 MIN

## 2025-08-23 SDOH — HEALTH STABILITY: PHYSICAL HEALTH: ON AVERAGE, HOW MANY DAYS PER WEEK DO YOU ENGAGE IN MODERATE TO STRENUOUS EXERCISE (LIKE A BRISK WALK)?: 4 DAYS

## 2025-08-23 ASSESSMENT — PATIENT HEALTH QUESTIONNAIRE - PHQ9
SUM OF ALL RESPONSES TO PHQ QUESTIONS 1-9: 0
1. LITTLE INTEREST OR PLEASURE IN DOING THINGS: NOT AT ALL
2. FEELING DOWN, DEPRESSED OR HOPELESS: NOT AT ALL

## 2025-08-23 ASSESSMENT — LIFESTYLE VARIABLES
HOW MANY STANDARD DRINKS CONTAINING ALCOHOL DO YOU HAVE ON A TYPICAL DAY: 1
HOW OFTEN DO YOU HAVE A DRINK CONTAINING ALCOHOL: MONTHLY OR LESS
HOW OFTEN DO YOU HAVE A DRINK CONTAINING ALCOHOL: 2
HOW MANY STANDARD DRINKS CONTAINING ALCOHOL DO YOU HAVE ON A TYPICAL DAY: 1 OR 2
HOW OFTEN DO YOU HAVE SIX OR MORE DRINKS ON ONE OCCASION: 1

## 2025-08-26 ENCOUNTER — OFFICE VISIT (OUTPATIENT)
Dept: FAMILY MEDICINE CLINIC | Age: 70
End: 2025-08-26
Payer: MEDICARE

## 2025-08-26 VITALS
HEART RATE: 60 BPM | BODY MASS INDEX: 29.23 KG/M2 | DIASTOLIC BLOOD PRESSURE: 80 MMHG | TEMPERATURE: 97.4 F | HEIGHT: 64 IN | WEIGHT: 171.2 LBS | SYSTOLIC BLOOD PRESSURE: 126 MMHG | RESPIRATION RATE: 16 BRPM

## 2025-08-26 DIAGNOSIS — Z78.0 MENOPAUSE: ICD-10-CM

## 2025-08-26 DIAGNOSIS — I10 ESSENTIAL HYPERTENSION: Chronic | ICD-10-CM

## 2025-08-26 DIAGNOSIS — Z00.00 MEDICARE ANNUAL WELLNESS VISIT, SUBSEQUENT: Primary | ICD-10-CM

## 2025-08-26 DIAGNOSIS — F51.01 PRIMARY INSOMNIA: ICD-10-CM

## 2025-08-26 DIAGNOSIS — H61.21 IMPACTED CERUMEN OF RIGHT EAR: ICD-10-CM

## 2025-08-26 DIAGNOSIS — E78.5 HYPERLIPIDEMIA, UNSPECIFIED HYPERLIPIDEMIA TYPE: ICD-10-CM

## 2025-08-26 DIAGNOSIS — Z12.31 ENCOUNTER FOR SCREENING MAMMOGRAM FOR MALIGNANT NEOPLASM OF BREAST: ICD-10-CM

## 2025-08-26 PROCEDURE — 3074F SYST BP LT 130 MM HG: CPT | Performed by: FAMILY MEDICINE

## 2025-08-26 PROCEDURE — G0439 PPPS, SUBSEQ VISIT: HCPCS | Performed by: FAMILY MEDICINE

## 2025-08-26 PROCEDURE — 1160F RVW MEDS BY RX/DR IN RCRD: CPT | Performed by: FAMILY MEDICINE

## 2025-08-26 PROCEDURE — 99212 OFFICE O/P EST SF 10 MIN: CPT | Performed by: FAMILY MEDICINE

## 2025-08-26 PROCEDURE — 1159F MED LIST DOCD IN RCRD: CPT | Performed by: FAMILY MEDICINE

## 2025-08-26 PROCEDURE — G2211 COMPLEX E/M VISIT ADD ON: HCPCS | Performed by: FAMILY MEDICINE

## 2025-08-26 PROCEDURE — 3079F DIAST BP 80-89 MM HG: CPT | Performed by: FAMILY MEDICINE

## 2025-08-26 PROCEDURE — 1123F ACP DISCUSS/DSCN MKR DOCD: CPT | Performed by: FAMILY MEDICINE

## 2025-08-26 RX ORDER — ZOLPIDEM TARTRATE 5 MG/1
5 TABLET ORAL NIGHTLY PRN
COMMUNITY
End: 2025-08-26

## 2025-08-26 RX ORDER — LISINOPRIL 20 MG/1
20 TABLET ORAL DAILY
Qty: 90 TABLET | Refills: 3 | Status: SHIPPED | OUTPATIENT
Start: 2025-08-26 | End: 2026-08-27

## 2025-08-26 RX ORDER — PRAVASTATIN SODIUM 80 MG/1
80 TABLET ORAL DAILY
Qty: 90 TABLET | Refills: 3 | Status: SHIPPED | OUTPATIENT
Start: 2025-08-26

## 2025-08-26 RX ORDER — ZOLPIDEM TARTRATE 5 MG/1
5 TABLET ORAL NIGHTLY PRN
Qty: 30 TABLET | Refills: 5 | Status: SHIPPED | OUTPATIENT
Start: 2025-08-26 | End: 2026-02-28

## (undated) DEVICE — GUIDEWIRE URO L150CM DIA0.035IN STIFF NIT HYDRPHLC STR TIP

## (undated) DEVICE — SOLUTION IV IRRIG WATER 1000ML POUR BRL 2F7114

## (undated) DEVICE — STRIP,CLOSURE,WOUND,MEDI-STRIP,1/2X4: Brand: MEDLINE

## (undated) DEVICE — SOLUTION IRRIGATION STRL H2O 1000 ML UROMATIC CONTAINER

## (undated) DEVICE — SINGLE-USE DIGITAL FLEXIBLE URETEROSCOPE: Brand: LITHOVUE

## (undated) DEVICE — SINGLE ACTION PUMPING SYSTEM

## (undated) DEVICE — SOLUTION IRRIG 3000ML 0.9% SOD CHL USP UROMATIC PLAS CONT

## (undated) DEVICE — CYSTO PACK: Brand: MEDLINE INDUSTRIES, INC.

## (undated) DEVICE — OPEN-END FLEXI-TIP URETERAL CATHETER: Brand: FLEXI-TIP

## (undated) DEVICE — ADAPTER URO SCP UROLOK LL

## (undated) DEVICE — CYSTO: Brand: MEDLINE INDUSTRIES, INC.